# Patient Record
Sex: FEMALE | Race: WHITE | NOT HISPANIC OR LATINO | Employment: FULL TIME | URBAN - METROPOLITAN AREA
[De-identification: names, ages, dates, MRNs, and addresses within clinical notes are randomized per-mention and may not be internally consistent; named-entity substitution may affect disease eponyms.]

---

## 2022-06-27 ENCOUNTER — TRANSFERRED RECORDS (OUTPATIENT)
Dept: MULTI SPECIALTY CLINIC | Facility: CLINIC | Age: 32
End: 2022-06-27

## 2022-06-27 LAB
HPV ABSTRACT: NORMAL
PAP-ABSTRACT: NORMAL

## 2022-09-02 ENCOUNTER — MEDICAL CORRESPONDENCE (OUTPATIENT)
Dept: HEALTH INFORMATION MANAGEMENT | Facility: CLINIC | Age: 32
End: 2022-09-02

## 2022-09-28 ENCOUNTER — OFFICE VISIT (OUTPATIENT)
Dept: FAMILY MEDICINE | Facility: CLINIC | Age: 32
End: 2022-09-28
Payer: COMMERCIAL

## 2022-09-28 VITALS
OXYGEN SATURATION: 99 % | WEIGHT: 198 LBS | HEART RATE: 91 BPM | SYSTOLIC BLOOD PRESSURE: 118 MMHG | DIASTOLIC BLOOD PRESSURE: 80 MMHG

## 2022-09-28 DIAGNOSIS — D47.Z2 CASTLEMAN DISEASE (H): ICD-10-CM

## 2022-09-28 DIAGNOSIS — F33.42 RECURRENT MAJOR DEPRESSION IN COMPLETE REMISSION (H): ICD-10-CM

## 2022-09-28 DIAGNOSIS — G43.009 MIGRAINE WITHOUT AURA AND WITHOUT STATUS MIGRAINOSUS, NOT INTRACTABLE: ICD-10-CM

## 2022-09-28 DIAGNOSIS — F41.1 GENERALIZED ANXIETY DISORDER: ICD-10-CM

## 2022-09-28 DIAGNOSIS — Z13.220 SCREENING FOR LIPID DISORDERS: ICD-10-CM

## 2022-09-28 DIAGNOSIS — F17.200 NICOTINE DEPENDENCE DUE TO VAPING NON-TOBACCO PRODUCT: ICD-10-CM

## 2022-09-28 DIAGNOSIS — Z00.00 ROUTINE GENERAL MEDICAL EXAMINATION AT A HEALTH CARE FACILITY: Primary | ICD-10-CM

## 2022-09-28 PROBLEM — F32.A DEPRESSIVE DISORDER: Status: ACTIVE | Noted: 2018-11-09

## 2022-09-28 PROBLEM — M77.50 INTERMETATARSAL BURSITIS: Status: ACTIVE | Noted: 2020-02-11

## 2022-09-28 PROBLEM — R31.21 ASYMPTOMATIC MICROSCOPIC HEMATURIA: Status: ACTIVE | Noted: 2022-01-03

## 2022-09-28 LAB
ANION GAP SERPL CALCULATED.3IONS-SCNC: 13 MMOL/L (ref 7–15)
BUN SERPL-MCNC: 11.8 MG/DL (ref 6–20)
CALCIUM SERPL-MCNC: 9.6 MG/DL (ref 8.6–10)
CHLORIDE SERPL-SCNC: 106 MMOL/L (ref 98–107)
CHOLEST SERPL-MCNC: 217 MG/DL
CREAT SERPL-MCNC: 0.79 MG/DL (ref 0.51–0.95)
DEPRECATED HCO3 PLAS-SCNC: 23 MMOL/L (ref 22–29)
GFR SERPL CREATININE-BSD FRML MDRD: >90 ML/MIN/1.73M2
GLUCOSE SERPL-MCNC: 90 MG/DL (ref 70–99)
HDLC SERPL-MCNC: 51 MG/DL
LDLC SERPL CALC-MCNC: 145 MG/DL
NONHDLC SERPL-MCNC: 166 MG/DL
POTASSIUM SERPL-SCNC: 4.1 MMOL/L (ref 3.4–5.3)
SODIUM SERPL-SCNC: 142 MMOL/L (ref 136–145)
TRIGL SERPL-MCNC: 103 MG/DL

## 2022-09-28 PROCEDURE — 80048 BASIC METABOLIC PNL TOTAL CA: CPT | Performed by: FAMILY MEDICINE

## 2022-09-28 PROCEDURE — 99385 PREV VISIT NEW AGE 18-39: CPT | Mod: 25 | Performed by: FAMILY MEDICINE

## 2022-09-28 PROCEDURE — 80061 LIPID PANEL: CPT | Performed by: FAMILY MEDICINE

## 2022-09-28 PROCEDURE — 99214 OFFICE O/P EST MOD 30 MIN: CPT | Mod: 25 | Performed by: FAMILY MEDICINE

## 2022-09-28 PROCEDURE — 36415 COLL VENOUS BLD VENIPUNCTURE: CPT | Performed by: FAMILY MEDICINE

## 2022-09-28 PROCEDURE — 91312 COVID-19,PF,PFIZER BOOSTER BIVALENT: CPT | Performed by: FAMILY MEDICINE

## 2022-09-28 PROCEDURE — 0124A COVID-19,PF,PFIZER BOOSTER BIVALENT: CPT | Performed by: FAMILY MEDICINE

## 2022-09-28 RX ORDER — NIFEDIPINE 30 MG/1
30 TABLET, EXTENDED RELEASE ORAL
COMMUNITY
Start: 2022-06-27 | End: 2022-09-28

## 2022-09-28 RX ORDER — SERTRALINE HYDROCHLORIDE 100 MG/1
TABLET, FILM COATED ORAL
COMMUNITY
Start: 2021-10-27 | End: 2022-09-28

## 2022-09-28 RX ORDER — BUTALBITAL, ACETAMINOPHEN AND CAFFEINE 300; 40; 50 MG/1; MG/1; MG/1
1 CAPSULE ORAL
COMMUNITY
Start: 2022-06-27 | End: 2024-01-31

## 2022-09-28 RX ORDER — ACETAMINOPHEN AND CODEINE PHOSPHATE 120; 12 MG/5ML; MG/5ML
1 SOLUTION ORAL
COMMUNITY
Start: 2022-06-27 | End: 2024-01-31

## 2022-09-28 RX ORDER — ACETAMINOPHEN 500 MG
500 TABLET ORAL
COMMUNITY
End: 2023-09-05

## 2022-09-28 RX ORDER — NAPROXEN 500 MG/1
TABLET ORAL
COMMUNITY
Start: 2021-07-10

## 2022-09-28 RX ORDER — ACETAMINOPHEN AND CODEINE PHOSPHATE 300; 30 MG/1; MG/1
1 TABLET ORAL EVERY 6 HOURS PRN
COMMUNITY
Start: 2021-10-19 | End: 2023-08-02

## 2022-09-28 NOTE — PROGRESS NOTES
"   SUBJECTIVE:   CC: Deanna is an 32 year old who presents for preventive health visit.     History of Present Illness       Reason for visit:  Wellness visit    She eats 2-3 servings of fruits and vegetables daily.She consumes 3 sweetened beverage(s) daily.She exercises with enough effort to increase her heart rate 9 or less minutes per day.  She exercises with enough effort to increase her heart rate 3 or less days per week. She is missing 7 dose(s) of medications per week.      PROBLEMS TO ADD ON...  1. Has migraine headaches and periodically has to use Tylenol #3 for more severe migraines.  Typically will start with regular Tylenol, then after a few hours will take Butalbital-APAP-Caffeine, then if not better, will take Tylenol #3.  Thinks she has an abortive therapy at home, can't remember the name.  Imitrex and Maxalt seemed to make the migraines worse.  No refills needed right now, unsure what she wants to do moving forward.  2. Has \"always\" had some anxiety and depression.  Focuses mainly on her health in terms of anxiety.  Feels that she's \"not good about taking sertraline lately\".  Takes this medication approximately twice weekly.  3. Hasn't started Micronor yet.  4. Nifedipine ER was for gestational hypertension and some blood pressure issues after birth.  Not taking now for about 1 month.      Today's PHQ-2 Score:   PHQ-2 ( 1999 [employer]) 9/28/2022   Q1: Little interest or pleasure in doing things 0   Q2: Feeling down, depressed or hopeless 0   PHQ-2 Score 0   Q1: Little interest or pleasure in doing things Not at all   Q2: Feeling down, depressed or hopeless Not at all   PHQ-2 Score 0       Abuse: Current or Past (Physical, Sexual or Emotional) - No  Do you feel safe in your environment? YES    Have you ever done Advance Care Planning? (For example, a Health Directive, POLST, or a discussion with a medical provider or your loved ones about your wishes): No, advance care planning information given to " patient to review.  Patient declined advance care planning discussion at this time.    Social History     Tobacco Use     Smoking status: Former Smoker     Years: 2.00     Start date:      Quit date:      Years since quittin.     Smokeless tobacco: Never Used   Substance Use Topics     Alcohol use: Not Currently         No flowsheet data found.    Reviewed orders with patient.  Reviewed health maintenance and updated orders accordingly - Yes    BP Readings from Last 3 Encounters:   22 118/80    Wt Readings from Last 3 Encounters:   22 89.8 kg (198 lb)                  Patient Active Problem List   Diagnosis     Asymptomatic microscopic hematuria     Castleman disease (H)     Depressive disorder     Generalized anxiety disorder     Intermetatarsal bursitis     Migraine without aura and without status migrainosus, not intractable     Normal spontaneous vaginal delivery     PCOS (polycystic ovarian syndrome)     Past Surgical History:   Procedure Laterality Date     BIOPSY/REMOVAL, LYMPH NODE(S)      left neck     CERVICAL FUSION       CHOLECYSTECTOMY         Social History     Tobacco Use     Smoking status: Former Smoker     Years: 2.00     Start date:      Quit date:      Years since quittin.7     Smokeless tobacco: Never Used   Substance Use Topics     Alcohol use: Not Currently     Family History   Problem Relation Age of Onset     Cancer Maternal Grandfather         lung     Breast Cancer No family hx of      Colorectal Cancer No family hx of      Coronary Artery Disease No family hx of      Coronary Artery Disease Early Onset No family hx of      Diabetes No family hx of          Current Outpatient Medications   Medication Sig Dispense Refill     acetaminophen-codeine (TYLENOL W/CODEINE #3) 300-30 MG per tablet Take 1 tablet by mouth every 6 hours as needed       Butalbital-APAP-Caffeine -40 MG CAPS Take 1 capsule by mouth       naproxen (NAPROSYN) 500 MG tablet Take 1  tab at onset of headache. Can repeat 1 more tab later in day if needed. Limit to 2 pills per day and no more than 3 days per week.       norethindrone (MICRONOR) 0.35 MG tablet Take 1 tablet by mouth       acetaminophen (TYLENOL) 500 MG tablet Take 500 mg by mouth       Allergies   Allergen Reactions     Bee Anaphylaxis and Swelling     Swelling of arm as a child         Breast Cancer Screening:    FHS-7: No flowsheet data found.      History of abnormal Pap smear: NO - age 30-65 PAP every 5 years with negative HPV co-testing recommended     Reviewed and updated as needed this visit by clinical staff   Tobacco  Allergies  Meds   Med Hx  Surg Hx  Fam Hx  Soc Hx          Reviewed and updated as needed this visit by Provider   Tobacco  Allergies  Meds  Problems  Med Hx  Surg Hx  Fam Hx             Review of Systems  CONSTITUTIONAL: NEGATIVE for fever, chills, change in weight  INTEGUMENTARU/SKIN: NEGATIVE for worrisome rashes, moles or lesions  EYES: NEGATIVE for vision changes or irritation  ENT: NEGATIVE for ear, mouth and throat problems  RESP: NEGATIVE for significant cough or SOB  BREAST: NEGATIVE for masses, tenderness or discharge  CV: NEGATIVE for chest pain, palpitations or peripheral edema  GI: NEGATIVE for nausea, abdominal pain, heartburn, or change in bowel habits  : NEGATIVE for unusual urinary or vaginal symptoms. Periods are regular.  MUSCULOSKELETAL: NEGATIVE for significant arthralgias or myalgia  NEURO: NEGATIVE for weakness, dizziness or paresthesias  PSYCHIATRIC: NEGATIVE for changes in mood or affect     OBJECTIVE:   /80   Pulse 91   Wt 89.8 kg (198 lb)   SpO2 99%   Breastfeeding Yes   Physical Exam  GENERAL: healthy, alert and no distress  EYES: Eyes grossly normal to inspection, PERRL and conjunctivae and sclerae normal  HENT: ear canals and TM's normal, nose and mouth without ulcers or lesions  NECK: no adenopathy, no asymmetry, masses, or scars and thyroid normal to  palpation  RESP: lungs clear to auscultation - no rales, rhonchi or wheezes  BREAST: normal without masses, tenderness or nipple discharge and no palpable axillary masses or adenopathy  CV: regular rate and rhythm, normal S1 S2, no S3 or S4, no murmur, click or rub, no peripheral edema and peripheral pulses strong  ABDOMEN: soft, nontender, no hepatosplenomegaly, no masses and bowel sounds normal  MS: no gross musculoskeletal defects noted, no edema  SKIN: no suspicious lesions or rashes  NEURO: Normal strength and tone, mentation intact and speech normal  PSYCH: mentation appears normal, affect normal/bright    Diagnostic Test Results:  Labs reviewed in Epic  Pending at time of note    ASSESSMENT/PLAN:   1. Routine general medical examination at a health care facility  Routine history and physical, updated in EMR.  Fasting labs updated as below.  Immunizations updated today.  Pap smear is up-to-date, will be due in 2027.  Plan repeat physical in 1 year.  - Lipid panel reflex to direct LDL Fasting; Future  - Basic metabolic panel  (Ca, Cl, CO2, Creat, Gluc, K, Na, BUN); Future    2. Recurrent major depression in complete remission (H)  3. Generalized anxiety disorder  Patient has not been taking her sertraline reliably.  We discussed that for maximal effectiveness and to minimize side effects, she really needs to take this daily, likely starting at a lower dose.  She wishes to hold off for now and reassess as needed.  She will stay off SSRI medication for the time being.  - Basic metabolic panel  (Ca, Cl, CO2, Creat, Gluc, K, Na, BUN); Future    4. Migraine without aura and without status migrainosus, not intractable  Patient has a stepwise approach to treatment using Tylenol, butalbital-acetaminophen-caffeine, and Tylenol 3 sparingly.  Unclear what abortive treatment she is currently using.  We can reassess this as needed as well.  - Basic metabolic panel  (Ca, Cl, CO2, Creat, Gluc, K, Na, BUN); Future    5.  Castleman disease (H)  No recent issues in this regard.  Has only had 1 lymph node removed in the past it sounds like.    6. Nicotine dependence due to vaping non-tobacco product  Discussed trying to taper by 1 vaping session per week.  Patient is contemplative about this.    7. Screening for lipid disorders  Fasting lipid profile updated today.  - Lipid panel reflex to direct LDL Fasting; Future      Patient has been advised of split billing requirements and indicates understanding: Yes    COUNSELING:  Reviewed preventive health counseling, as reflected in patient instructions  Special attention given to:        Regular exercise       Healthy diet/nutrition       Contraception       Family planning    There is no height or weight on file to calculate BMI.        She reports that she quit smoking about 9 years ago. She started smoking about 11 years ago. She quit after 2.00 years of use. She has never used smokeless tobacco.      Counseling Resources:  ATP IV Guidelines  Pooled Cohorts Equation Calculator  Breast Cancer Risk Calculator  BRCA-Related Cancer Risk Assessment: FHS-7 Tool  FRAX Risk Assessment  ICSI Preventive Guidelines  Dietary Guidelines for Americans, 2010  USDA's MyPlate  ASA Prophylaxis  Lung CA Screening    Nancy Jones MD  [facility]

## 2022-09-28 NOTE — PROGRESS NOTES
{PROVIDER CHARTING PREFERENCE:343156}    Sujatha Huerta is a 32 year old{ACCOMPANIED BY STATEMENT (Optional):589418}, presenting for the following health issues:  Establish Care      History of Present Illness       Reason for visit:  Wellness visit    She eats 2-3 servings of fruits and vegetables daily.She consumes 3 sweetened beverage(s) daily.She exercises with enough effort to increase her heart rate 9 or less minutes per day.  She exercises with enough effort to increase her heart rate 3 or less days per week. She is missing 7 dose(s) of medications per week.       {SUPERLIST (Optional):118504}  {additonal problems for provider to add (Optional):134626}    Review of Systems   {ROS COMP (Optional):649123}      Objective    There were no vitals taken for this visit.  There is no height or weight on file to calculate BMI.  Physical Exam   {Exam List (Optional):270881}    {Diagnostic Test Results (Optional):311471}    {AMBULATORY ATTESTATION (Optional):065645}

## 2022-09-28 NOTE — PATIENT INSTRUCTIONS
Feel free to follow-up sooner than your next physical if needed for depression or anxiety.      Preventive Health Recommendations  Female Ages 26 - 39  Yearly exam:   See your health care provider every year in order to  Review health changes.   Discuss preventive care.    Review your medicines if you your doctor has prescribed any.    Until age 30: Get a Pap test every three years (more often if you have had an abnormal result).    After age 30: Talk to your doctor about whether you should have a Pap test every 3 years or have a Pap test with HPV screening every 5 years.   You do not need a Pap test if your uterus was removed (hysterectomy) and you have not had cancer.  You should be tested each year for STDs (sexually transmitted diseases), if you're at risk.   Talk to your provider about how often to have your cholesterol checked.  If you are at risk for diabetes, you should have a diabetes test (fasting glucose).  Shots: Get a flu shot each year. Get a tetanus shot every 10 years.   Nutrition:   Eat at least 5 servings of fruits and vegetables each day.  Eat whole-grain bread, whole-wheat pasta and brown rice instead of white grains and rice.  Get adequate Calcium and Vitamin D.     Lifestyle  Exercise at least 150 minutes a week (30 minutes a day, 5 days of the week). This will help you control your weight and prevent disease.  Limit alcohol to one drink per day.  No smoking.   Wear sunscreen to prevent skin cancer.  See your dentist every six months for an exam and cleaning.

## 2022-10-25 ENCOUNTER — PREP FOR PROCEDURE (OUTPATIENT)
Dept: VASCULAR SURGERY | Facility: CLINIC | Age: 32
End: 2022-10-25

## 2022-10-25 ENCOUNTER — OFFICE VISIT (OUTPATIENT)
Dept: PODIATRY | Facility: CLINIC | Age: 32
End: 2022-10-25
Payer: COMMERCIAL

## 2022-10-25 VITALS — OXYGEN SATURATION: 90 % | HEART RATE: 97 BPM | HEIGHT: 64 IN | WEIGHT: 196 LBS | BODY MASS INDEX: 33.46 KG/M2

## 2022-10-25 DIAGNOSIS — L98.9 SKIN LESION OF FOOT: ICD-10-CM

## 2022-10-25 DIAGNOSIS — M76.70 PERONEAL TENDINITIS, UNSPECIFIED LATERALITY: Primary | ICD-10-CM

## 2022-10-25 DIAGNOSIS — L57.0 KERATOMA: ICD-10-CM

## 2022-10-25 PROCEDURE — 99203 OFFICE O/P NEW LOW 30 MIN: CPT | Mod: 25 | Performed by: PODIATRIST

## 2022-10-25 PROCEDURE — 11056 PARNG/CUTG B9 HYPRKR LES 2-4: CPT | Performed by: PODIATRIST

## 2022-10-25 RX ORDER — NAPROXEN 500 MG/1
500 TABLET ORAL 2 TIMES DAILY WITH MEALS
Qty: 28 TABLET | Refills: 0 | Status: SHIPPED | OUTPATIENT
Start: 2022-10-25 | End: 2023-08-02

## 2022-10-25 ASSESSMENT — PAIN SCALES - GENERAL: PAINLEVEL: MILD PAIN (2)

## 2022-10-25 NOTE — PROGRESS NOTES
Patient's OB/GYN has okay'd starting naproxen. I sent a prescription for naproxen today and notified the patient.

## 2022-10-25 NOTE — PROGRESS NOTES
FOOT AND ANKLE SURGERY/PODIATRY CONSULT NOTE         ASSESSMENT:   Peroneal Tendinitis bilateral  Skin lesion left foot      TREATMENT:  Peroneal Tendinitis: I reviewed the etiology of peroneal tendinitis and recommend more supportive shoes.     -The patient is breast feeding and I have asked she confirm with her OB/GYN that naproxen is okay to begin at this time.     -Will also consider physical therapy, CAM boot, foot x-rays if symptoms persist.     Skin lesion left heel: I trimmed callus tissue x2 with a #15 blade plantar medial left heel.    -I discussed with the patient that the skin lesion does not appear consistent with a wart. I recommend a punch biopsy for further evaluation. Reviewed the procedure and post-op course.     -Patient's questions invited and answered. She was encouraged to call my office with any further questions or concerns.     Srinivasa Del Rio DPM  [facility] Podiat[facility]/Foot & Ankle Surgery      HPI: I was asked to see Deanna Hernandez today for painful feet and a skin lesion on her left heel. The patient states she has had pain in both feet for several months, denies trauma or previous treatment. She is currently breastfeeding. She also would like to discuss a skin lesion on the left heel which she has had for the past 15 years. Admits to having the skin frozen in the past with no improvement.       Past Medical History:   Diagnosis Date     Normal spontaneous vaginal delivery 2019         Social History     Socioeconomic History     Marital status:      Spouse name: Not on file     Number of children: Not on file     Years of education: Not on file     Highest education level: Not on file   Occupational History     Not on file   Tobacco Use     Smoking status: Every Day     Years: 2.00     Types: Cigarettes, Vaping Device     Start date:      Last attempt to quit: 2013     Years since quittin.8     Smokeless tobacco: Never   Vaping Use     Vaping Use: Every  "day     Substances: Nicotine   Substance and Sexual Activity     Alcohol use: Not Currently     Drug use: Not Currently     Sexual activity: Yes     Partners: Male     Birth control/protection: None   Other Topics Concern     Not on file   Social History Narrative     Not on file     Social Determinants of Health     Financial Resource Strain: Not on file   Food Insecurity: Not on file   Transportation Needs: Not on file   Physical Activity: Not on file   Stress: Not on file   Social Connections: Not on file   Intimate Partner Violence: Not on file   Housing Stability: Not on file            Allergies   Allergen Reactions     Bee Anaphylaxis and Swelling     Swelling of arm as a child           MEDICATIONS:   Current Outpatient Medications   Medication     acetaminophen (TYLENOL) 500 MG tablet     acetaminophen-codeine (TYLENOL W/CODEINE #3) 300-30 MG per tablet     Butalbital-APAP-Caffeine -40 MG CAPS     naproxen (NAPROSYN) 500 MG tablet     norethindrone (MICRONOR) 0.35 MG tablet     No current facility-administered medications for this visit.        Family History   Problem Relation Age of Onset     Cancer Maternal Grandfather         lung     Breast Cancer No family hx of      Colorectal Cancer No family hx of      Coronary Artery Disease No family hx of      Coronary Artery Disease Early Onset No family hx of      Diabetes No family hx of           Review of Systems - 10 point Review of Systems is negative except for foot pain which is noted in HPI.    OBJECTIVE:  Appearance: alert, well appearing, and in no distress.    VITAL SIGNS: Pulse 97   Ht 1.626 m (5' 4\")   Wt 88.9 kg (196 lb)   SpO2 90%   BMI 33.64 kg/m        General appearance: Patient is alert and fully cooperative with history & exam.  No sign of distress is noted during the visit.     Psychiatric: Affect is pleasant & appropriate.  Patient appears motivated to improve health.     Respiratory: Breathing is regular & unlabored while " sitting.     HEENT: Hearing is intact to spoken word.  Speech is clear.  No gross evidence of visual impairment that would impact ambulation.      Vascular: Dorsalis pedis and posterior tibial pulses are palpable. There is pedal hair growth bilateral.  CFT < 3 sec from anterior tibial surface to distal digits bilateral. There is no appreciable edema noted.  Dermatologic: Hyperkeratotic tissue plantar medial left heel x2, after debridement there is disruption of the normal skin lines without pinpoint bleeding.  Neurologic: All epicritic and proprioceptive sensations are grossly intact bilateral.  Musculoskeletal: Mild pain along the course of the peroneal tendons bilateral and at insertion to the 5th metatarsal base.

## 2022-10-25 NOTE — PATIENT INSTRUCTIONS
Naproxen and naproxen sodium oral immediate-release tablets    Brand Names: Aflaxen, Aleve, Aleve Arthritis, All Day Relief, Anaprox, Anaprox DS, Naprosyn   What is this medicine?  NAPROXEN (na PROX en) is a non-steroidal anti-inflammatory drug (NSAID). It is used to reduce swelling and to treat pain. This medicine may be used for dental pain, headache, or painful monthly periods. It is also used for painful joint and muscular problems such as arthritis, tendinitis, bursitis, and gout.  How should I use this medicine?  Take this medicine by mouth with a glass of water. Follow the directions on the prescription label. Take it with food if your stomach gets upset. Try to not lie down for at least 10 minutes after you take it. Take your medicine at regular intervals. Do not take your medicine more often than directed. Long-term, continuous use may increase the risk of heart attack or stroke.  A special MedGuide will be given to you by the pharmacist with each prescription and refill. Be sure to read this information carefully each time.  Talk to your pediatrician regarding the use of this medicine in children. Special care may be needed.  What side effects may I notice from receiving this medicine?  Side effects that you should report to your doctor or health care professional as soon as possible:  black or bloody stools, blood in the urine or vomit  blurred vision  chest pain  difficulty breathing or wheezing  nausea or vomiting  severe stomach pain  skin rash, skin redness, blistering or peeling skin, hives, or itching  slurred speech or weakness on one side of the body  swelling of eyelids, throat, lips  unexplained weight gain or swelling  unusually weak or tired  yellowing of eyes or skin  Side effects that usually do not require medical attention (report to your doctor or health care professional if they continue or are bothersome):  constipation  headache  heartburn  What may interact with this  medicine?  alcohol  aspirin  cidofovir  diuretics  lithium  methotrexate  other drugs for inflammation like ketorolac or prednisone  pemetrexed  probenecid  warfarin  What if I miss a dose?  If you miss a dose, take it as soon as you can. If it is almost time for your next dose, take only that dose. Do not take double or extra doses.  Where should I keep my medicine?  Keep out of the reach of children.  Store at room temperature between 15 and 30 degrees C (59 and 86 degrees F). Keep container tightly closed. Throw away any unused medicine after the expiration date.  What should I tell my health care provider before I take this medicine?  They need to know if you have any of these conditions:  cigarette smoker  coronary artery bypass graft (CABG) surgery within the past 2 weeks  drink more than 3 alcohol-containing drinks a day  heart disease  high blood pressure  history of stomach bleeding  kidney disease  liver disease  lung or breathing disease, like asthma  an unusual or allergic reaction to naproxen, aspirin, other NSAIDs, other medicines, foods, dyes, or preservatives  pregnant or trying to get pregnant  breast-feeding  What should I watch for while using this medicine?  Tell your doctor or health care professional if your pain does not get better. Talk to your doctor before taking another medicine for pain. Do not treat yourself.  This medicine does not prevent heart attack or stroke. In fact, this medicine may increase the chance of a heart attack or stroke. The chance may increase with longer use of this medicine and in people who have heart disease. If you take aspirin to prevent heart attack or stroke, talk with your doctor or health care professional.  Do not take other medicines that contain aspirin, ibuprofen, or naproxen with this medicine. Side effects such as stomach upset, nausea, or ulcers may be more likely to occur. Many medicines available without a prescription should not be taken with this  medicine.  This medicine can cause ulcers and bleeding in the stomach and intestines at any time during treatment. Do not smoke cigarettes or drink alcohol. These increase irritation to your stomach and can make it more susceptible to damage from this medicine. Ulcers and bleeding can happen without warning symptoms and can cause death.  You may get drowsy or dizzy. Do not drive, use machinery, or do anything that needs mental alertness until you know how this medicine affects you. Do not stand or sit up quickly, especially if you are an older patient. This reduces the risk of dizzy or fainting spells.  This medicine can cause you to bleed more easily. Try to avoid damage to your teeth and gums when you brush or floss your teeth.    Medicine is given to help treat or prevent illness. But if you don t take it correctly, it might not help. It might even harm you. Your healthcare provider or pharmacist can help you learn the right way to take your medicine. Listed below are some tips to help you take medicine safely.  Safety tips  Have a routine for taking each medicine. Make it part of something you do each day, such as brushing your teeth or eating a meal.  When you go to the hospital or your healthcare provider s office, bring all your current medicines in their original boxes or bottles. If you can t do that, bring an up-to-date list of your medicines.  Don't stop taking a prescription medicine unless your healthcare provider tells you to. Doing so could make your condition worse.  Don't share medicines.  Let your healthcare provider and pharmacist know of any allergies you have.  Taking prescription medicines with alcohol, street drugs, herbs, supplements, or even some over-the-counter medicines can be harmful. Talk to your healthcare provider or pharmacist before using any of these things while taking a prescription medicine.  When filling your prescriptions, try using the same pharmacy for all your medicines. If  that isn't possible, let each pharmacist know what medicines you are already taking.  Keep medicines out of the reach of children and pets. Store medicines in a cool, dry, dark place -- not in the bathroom or in the kitchen near moisture or heat.  Don't use medicine that has  or that doesn t look or smell right. Call your pharmacist for instructions on how to dispose of your medicines or where you can take them for safe disposal.  Medicine that comes in a container for a single dose should be used only 1 time. If you use the container a second time, it may have germs in it that can cause illness. These illnesses include hepatitis B and C. They also include infections of the brain or spinal cord (meningitis and epidural abscess).

## 2022-10-25 NOTE — LETTER
10/25/2022         RE: Deanna Hernandez  [address]  [place] [address]        Dear Colleague,    Thank you for referring your patient, Deanna Hernandez, to the [facility]. Please see a copy of my visit note below.          FOOT AND ANKLE SURGERY/PODIATRY CONSULT NOTE         ASSESSMENT:   Peroneal Tendinitis bilateral  Skin lesion left foot      TREATMENT:  Peroneal Tendinitis: I reviewed the etiology of peroneal tendinitis and recommend more supportive shoes.     -The patient is breast feeding and I have asked she confirm with her OB/GYN that naproxen is okay to begin at this time.     -Will also consider physical therapy, CAM boot, foot x-rays if symptoms persist.     Skin lesion left heel: I trimmed callus tissue x2 with a #15 blade plantar medial left heel.    -I discussed with the patient that the skin lesion does not appear consistent with a wart. I recommend a punch biopsy for further evaluation. Reviewed the procedure and post-op course.     -Patient's questions invited and answered. She was encouraged to call my office with any further questions or concerns.     Srinivasa Del Rio DPM  [facility] Podiatry/Foot & Ankle Surgery      HPI: I was asked to see Deanna Hernandez today for painful feet and a skin lesion on her left heel. The patient states she has had pain in both feet for several months, denies trauma or previous treatment. She is currently breastfeeding. She also would like to discuss a skin lesion on the left heel which she has had for the past 15 years. Admits to having the skin frozen in the past with no improvement.       Past Medical History:   Diagnosis Date     Normal spontaneous vaginal delivery 11/4/2019         Social History     Socioeconomic History     Marital status:      Spouse name: Not on file     Number of children: Not on file     Years of education: Not on file     Highest education level: Not on file   Occupational  "History     Not on file   Tobacco Use     Smoking status: Every Day     Years: 2.00     Types: Cigarettes, Vaping Device     Start date:      Last attempt to quit: 2013     Years since quittin.8     Smokeless tobacco: Never   Vaping Use     Vaping Use: Every day     Substances: Nicotine   Substance and Sexual Activity     Alcohol use: Not Currently     Drug use: Not Currently     Sexual activity: Yes     Partners: Male     Birth control/protection: None   Other Topics Concern     Not on file   Social History Narrative     Not on file     Social Determinants of Health     Financial Resource Strain: Not on file   Food Insecurity: Not on file   Transportation Needs: Not on file   Physical Activity: Not on file   Stress: Not on file   Social Connections: Not on file   Intimate Partner Violence: Not on file   Housing Stability: Not on file            Allergies   Allergen Reactions     Bee Anaphylaxis and Swelling     Swelling of arm as a child           MEDICATIONS:   Current Outpatient Medications   Medication     acetaminophen (TYLENOL) 500 MG tablet     acetaminophen-codeine (TYLENOL W/CODEINE #3) 300-30 MG per tablet     Butalbital-APAP-Caffeine -40 MG CAPS     naproxen (NAPROSYN) 500 MG tablet     norethindrone (MICRONOR) 0.35 MG tablet     No current facility-administered medications for this visit.        Family History   Problem Relation Age of Onset     Cancer Maternal Grandfather         lung     Breast Cancer No family hx of      Colorectal Cancer No family hx of      Coronary Artery Disease No family hx of      Coronary Artery Disease Early Onset No family hx of      Diabetes No family hx of           Review of Systems - 10 point Review of Systems is negative except for foot pain which is noted in HPI.    OBJECTIVE:  Appearance: alert, well appearing, and in no distress.    VITAL SIGNS: Pulse 97   Ht 1.626 m (5' 4\")   Wt 88.9 kg (196 lb)   SpO2 90%   BMI 33.64 kg/m        General appearance: " Patient is alert and fully cooperative with history & exam.  No sign of distress is noted during the visit.     Psychiatric: Affect is pleasant & appropriate.  Patient appears motivated to improve health.     Respiratory: Breathing is regular & unlabored while sitting.     HEENT: Hearing is intact to spoken word.  Speech is clear.  No gross evidence of visual impairment that would impact ambulation.      Vascular: Dorsalis pedis and posterior tibial pulses are palpable. There is pedal hair growth bilateral.  CFT < 3 sec from anterior tibial surface to distal digits bilateral. There is no appreciable edema noted.  Dermatologic: Hyperkeratotic tissue plantar medial left heel x2, after debridement there is disruption of the normal skin lines without pinpoint bleeding.  Neurologic: All epicritic and proprioceptive sensations are grossly intact bilateral.  Musculoskeletal: Mild pain along the course of the peroneal tendons bilateral and at insertion to the 5th metatarsal base.             Again, thank you for allowing me to participate in the care of your patient.        Sincerely,        Srinivasa Del Rio DPM

## 2022-11-16 ENCOUNTER — MEDICAL CORRESPONDENCE (OUTPATIENT)
Dept: HEALTH INFORMATION MANAGEMENT | Facility: CLINIC | Age: 32
End: 2022-11-16

## 2023-03-29 ENCOUNTER — MEDICAL CORRESPONDENCE (OUTPATIENT)
Dept: HEALTH INFORMATION MANAGEMENT | Facility: CLINIC | Age: 33
End: 2023-03-29

## 2023-05-26 ENCOUNTER — OFFICE VISIT (OUTPATIENT)
Dept: FAMILY MEDICINE | Facility: CLINIC | Age: 33
End: 2023-05-26
Payer: COMMERCIAL

## 2023-05-26 VITALS
DIASTOLIC BLOOD PRESSURE: 89 MMHG | HEART RATE: 86 BPM | TEMPERATURE: 98.8 F | RESPIRATION RATE: 14 BRPM | OXYGEN SATURATION: 100 % | SYSTOLIC BLOOD PRESSURE: 131 MMHG

## 2023-05-26 DIAGNOSIS — H60.11 CELLULITIS OF RIGHT PINNA: Primary | ICD-10-CM

## 2023-05-26 PROCEDURE — 99213 OFFICE O/P EST LOW 20 MIN: CPT | Performed by: PHYSICIAN ASSISTANT

## 2023-05-26 RX ORDER — CEFDINIR 300 MG/1
300 CAPSULE ORAL 2 TIMES DAILY
Qty: 20 CAPSULE | Refills: 0 | Status: SHIPPED | OUTPATIENT
Start: 2023-05-26 | End: 2023-06-05

## 2023-05-26 NOTE — PROGRESS NOTES
Assessment & Plan:      Problem List Items Addressed This Visit    None  Visit Diagnoses     Cellulitis of right pinna    -  Primary    Relevant Medications    cefdinir (OMNICEF) 300 MG capsule        Medical Decision Making  Patient presents with acute onset swelling, erythema, and pain of the outer right ear.  Physical exam appears consistent with ear cellulitis likely secondary to previous ear piercing.  Recommend oral antibiotics.  May continue with cold compresses and ibuprofen as needed for pain and swelling.  Discussed treatment and symptomatic care.  Allergies and medication interactions reviewed.  Discussed signs of worsening symptoms and when to follow-up with PCP if no symptom improvement.     Subjective:      Deanna Hernandez is a 33 year old female here for evaluation of swelling, redness, and pain of the right outer ear.  Patient had a piercing of the auricle of the ear about 12 years ago and has had recurring ear infections ever since.  No other fevers, cough, rhinorrhea.  No recent swimming exposure.     The following portions of the patient's history were reviewed and updated as appropriate: allergies, current medications, and problem list.     Review of Systems  Pertinent items are noted in HPI.    Allergies  Allergies   Allergen Reactions     Bee Anaphylaxis and Swelling     Swelling of arm as a child         Family History   Problem Relation Age of Onset     Cancer Maternal Grandfather         lung     Breast Cancer No family hx of      Colorectal Cancer No family hx of      Coronary Artery Disease No family hx of      Coronary Artery Disease Early Onset No family hx of      Diabetes No family hx of        Social History     Tobacco Use     Smoking status: Every Day     Types: Vaping Device     Smokeless tobacco: Never   Vaping Use     Vaping status: Every Day     Substances: Nicotine   Substance Use Topics     Alcohol use: Not Currently        Objective:      /89   Pulse 86   Temp 98.8   F (37.1  C)   Resp 14   SpO2 100%   General appearance - alert, well appearing, and in no distress and non-toxic  Ears - Right: Outer auricle is erythematous, swollen, and covered in slight honey colored crusting; erythema extends into the external ear canal; TM otherwise intact with no fluid, bulging, erythema; no extension of the erythema over the mastoid region    The use of Dragon/As It Is dictation services was used to construct the content of this note; any grammatical errors are non-intentional. Please contact the author directly if you are in need of any clarification.

## 2023-07-22 ENCOUNTER — HOSPITAL ENCOUNTER (EMERGENCY)
Facility: CLINIC | Age: 33
Discharge: HOME | End: 2023-07-22
Attending: EMERGENCY MEDICINE | Admitting: EMERGENCY MEDICINE
Payer: COMMERCIAL

## 2023-07-22 VITALS
HEART RATE: 92 BPM | SYSTOLIC BLOOD PRESSURE: 136 MMHG | DIASTOLIC BLOOD PRESSURE: 88 MMHG | HEIGHT: 64 IN | RESPIRATION RATE: 16 BRPM | TEMPERATURE: 97.9 F | BODY MASS INDEX: 32.1 KG/M2 | OXYGEN SATURATION: 99 % | WEIGHT: 188 LBS

## 2023-07-22 DIAGNOSIS — S61.210A LACERATION OF RIGHT INDEX FINGER WITHOUT FOREIGN BODY WITHOUT DAMAGE TO NAIL, INITIAL ENCOUNTER: ICD-10-CM

## 2023-07-22 PROCEDURE — 12001 RPR S/N/AX/GEN/TRNK 2.5CM/<: CPT

## 2023-07-22 PROCEDURE — 99283 EMERGENCY DEPT VISIT LOW MDM: CPT | Mod: 25

## 2023-07-22 ASSESSMENT — ENCOUNTER SYMPTOMS: WOUND: 1

## 2023-07-22 ASSESSMENT — ACTIVITIES OF DAILY LIVING (ADL): ADLS_ACUITY_SCORE: 35

## 2023-07-22 NOTE — ED PROVIDER NOTES
Emergency Department Encounter      NAME: Deanna Hernandez  AGE: 33 year old female  YOB: 1990  MRN: [id number]  EVALUATION DATE & TIME: 2023 12:34 AM    PCP: Nancy Jones    ED PROVIDER: James Campbell M.D.      Chief Complaint   Patient presents with     Fall     Hand Pain         FINAL IMPRESSION:  1. Laceration of right index finger without foreign body without damage to nail, initial encounter        MEDICAL DECISION MAKIN:00 AM I met with the patient, obtained history, performed an initial exam, and discussed options and plan for diagnostics and treatment here in the ED.     This patient is a 33-year-old female with a history of polycystic ovarian disease who presents with finger laceration.  Patient states that she was drinking alcohol tonight and that she excellently tripped and fell on gravel.  After the fall she noticed that she had a laceration of the right index finger pad.  She did not have any other injuries from the fall.  She does not think there is any foreign body within the wound.  On my exam she had a small fairly superficial laceration at the right index fingertip.  There was no active bleeding, signs of infection or foreign body.  There was no bony tenderness on the digits.  I discussed with her different ways of repairing the laceration.  She did not feel she be able to care for it properly if I glued it with Dermabond so we proceeded to repair with sutures.  I did a digital block on the patient which was incomplete and they needed to inject additional lidocaine within the edges of the wound.  A tourniquet was placed and removed after the procedure.  There was no sign of foreign body seen.  no bone, tendon or joint involvement.  The wound edges reapproximated using 5-0 Ethilon suture.  We discussed signs and symptoms of wound infection and she was discharged home to have the sutures removed by her doctor's office in 7 to 10 days.    Pertinent Labs & Imaging  studies reviewed. (See chart for details)    The importance of close follow up was discussed. We reviewed warning signs and symptoms, and I instructed Ms. Hernandez to return to the emergency department immediately if she develops any new or worsening symptoms. I provided additional verbal discharge instructions. Ms. Hernandez expressed understanding and agreement with this plan of care, her questions were answered, and she was discharged in stable condition.     Medical Decision Making     History:    Supplemental history from: Documented in chart, if applicable    External Record(s) reviewed: Documented in chart, if applicable.     Work Up:    Chart documentation includes differential considered and any EKGs or imaging independently interpreted by provider, where specified.    In additional to work up documented, I considered the following work up: Documented in chart, if applicable.     External consultation:    Discussion of management with another provider: Documented in chart, if applicable     Complicating factors:    Care impacted by chronic illness: NA    Care affected by social determinants of health: Access to Medical Care     Disposition considerations: Discharge      MEDICATIONS GIVEN IN THE EMERGENCY:  Medications - No data to display    NEW PRESCRIPTIONS STARTED AT TODAY'S ER VISIT:  Discharge Medication List as of 7/22/2023  2:26 AM             =================================================================    HPI    Patient information was obtained from: patient and friend    Use of : N/A       Deanna Hernandez is a 33 year old female with a past medical history of polycystic ovary disease who presents with finger laceration.    The patient presents with a right index finger laceration that happened when she tripped and fell on gravel. The laceration is on the pad of her finger. She has been drinking alcohol.  The patient has 4 kids and worried that Steri-strips would be at risk of coming  off.     She denies any other complaints at this time.       REVIEW OF SYSTEMS   Review of Systems   Skin: Positive for wound (right index finger).   All other systems reviewed and are negative.       PAST MEDICAL HISTORY:  Past Medical History:   Diagnosis Date     Normal spontaneous vaginal delivery 11/4/2019       PAST SURGICAL HISTORY:  Past Surgical History:   Procedure Laterality Date     BIOPSY/REMOVAL, LYMPH NODE(S)      left neck     CERVICAL FUSION       CHOLECYSTECTOMY         CURRENT MEDICATIONS:    No current facility-administered medications for this encounter.    Current Outpatient Medications:      acetaminophen (TYLENOL) 500 MG tablet, Take 500 mg by mouth (Patient not taking: Reported on 5/26/2023), Disp: , Rfl:      acetaminophen-codeine (TYLENOL W/CODEINE #3) 300-30 MG per tablet, Take 1 tablet by mouth every 6 hours as needed, Disp: , Rfl:      Butalbital-APAP-Caffeine -40 MG CAPS, Take 1 capsule by mouth (Patient not taking: Reported on 5/26/2023), Disp: , Rfl:      EPINEPHrine (ANY BX GENERIC EQUIV) 0.3 MG/0.3ML injection 2-pack, INJECT 1 SYRINGE INTRAMUSCULARLY AS NEEDED FOR BEE STING (Patient not taking: Reported on 5/26/2023), Disp: , Rfl:      ibuprofen (ADVIL/MOTRIN) 600 MG tablet, Take 600 mg by mouth every 6 hours as needed, Disp: , Rfl:      naproxen (NAPROSYN) 500 MG tablet, Take 1 tablet (500 mg) by mouth 2 times daily (with meals) (Patient not taking: Reported on 5/26/2023), Disp: 28 tablet, Rfl: 0     naproxen (NAPROSYN) 500 MG tablet, Take 1 tab at onset of headache. Can repeat 1 more tab later in day if needed. Limit to 2 pills per day and no more than 3 days per week. (Patient not taking: Reported on 5/26/2023), Disp: , Rfl:      norethindrone (MICRONOR) 0.35 MG tablet, Take 1 tablet by mouth (Patient not taking: Reported on 5/26/2023), Disp: , Rfl:      ondansetron (ZOFRAN ODT) 4 MG ODT tab, DISSOLVE 1 TABLET IN MOUTH EVERY 6 HOURS AS NEEDED FOR NAUSEA FOR VOMITING  "(Patient not taking: Reported on 5/26/2023), Disp: , Rfl:     ALLERGIES:  Allergies   Allergen Reactions     Bee Anaphylaxis and Swelling     Swelling of arm as a child         FAMILY HISTORY:  Family History   Problem Relation Age of Onset     Cancer Maternal Grandfather         lung     Breast Cancer No family hx of      Colorectal Cancer No family hx of      Coronary Artery Disease No family hx of      Coronary Artery Disease Early Onset No family hx of      Diabetes No family hx of        SOCIAL HISTORY:   Social History     Socioeconomic History     Marital status:    Tobacco Use     Smoking status: Every Day     Types: Vaping Device     Smokeless tobacco: Never   Vaping Use     Vaping Use: Every day     Substances: Nicotine   Substance and Sexual Activity     Alcohol use: Not Currently     Drug use: Not Currently     Sexual activity: Yes     Partners: Male     Birth control/protection: None       PHYSICAL EXAM:    Vitals: /88   Pulse 92   Temp 97.9  F (36.6  C) (Oral)   Resp 16   Ht 1.613 m (5' 3.5\")   Wt 85.3 kg (188 lb)   LMP 07/14/2023 (Exact Date)   SpO2 99%   BMI 32.78 kg/m     Constitutional: Well developed, well nourished. Comfortable appearing.  HEAD:Normocephalic, atraumatic,   Musculoskeletal: Moving all 4 extremities intentionally and without pain. No obvious deformity.  Skin: Warm, dry, no rash. 1.5 cm laceration to pad of right index finger. No bony tenderness. No active bleeding, foreign body, or sign of infection.  Neurologic: Alert & oriented x 3, speech clear, moving all extremities spontaneously   Psychiatric: Affect normal, cooperative.     LAB:  All pertinent labs reviewed and interpreted.  Labs Ordered and Resulted from Time of ED Arrival to Time of ED Departure - No data to display    RADIOLOGY:  No orders to display     PROCEDURES:   PROCEDURE: Laceration Repair   INDICATIONS: Laceration   PROCEDURE PROVIDER: Dr James Campbell   SITE: Right index finger pad "   TYPE/SIZE: simple, clean and no foreign body visualized  1.5 cm (total length)   FUNCTIONAL ASSESSMENT: Distal sensation, circulation and motor intact   MEDICATION: 3 mLs of 1% Lidocaine with epinephrine   PREPARATION: irrigation with Normal saline and Betadine   DEBRIDEMENT: wound explored, no foreign body found and minimal debridement   CLOSURE:  Superficial layer closed with 4 stitches of 5-0 Ethilon simple interrupted    Total number of sutures/staples placed: 4       PROCEDURE: Digital Block   INDICATIONS: Finger laceration   PROCEDURE PROVIDER: Dr James Campbell   SITE: Right index finger (2nd digit)   MEDICATION: 3 mL of 1% Lidocaine with epinephrine   NOTE: The skin overlying the site for injection was prepped with saline and betadine.  Needle was inserted in a standard three point injection pattern.  Each time the area was aspirated and there was no return of blood.  I then injected the medication at the base of the digit.  The patient had good response to the procedure   COMPLICATIONS: Patient tolerated procedure well, without complication           I, Zelalem Vergara, am serving as a scribe to document services personally performed by Dr. James Campbell based on my observation and the provider's statements to me. IJames M.D. attest that Zelalem Vergara is acting in a scribe capacity, has observed my performance of the services and has documented them in accordance with my direction.      James Campbell M.D.  Emergency Medicine  [facility] EMERGENCY ROOM  [address] [address]  [phone]  Dept: [phone]     James Campbell MD  07/23/23 0005

## 2023-07-22 NOTE — ED TRIAGE NOTES
Pt tripped and fell on gravel, lacerated pad of right index finger.  +ETOH     Triage Assessment     Row Name 07/22/23 0031       Triage Assessment (Adult)    Airway WDL WDL       Respiratory WDL    Respiratory WDL WDL       Skin Circulation/Temperature WDL    Skin Circulation/Temperature WDL WDL       Cardiac WDL    Cardiac WDL WDL       Peripheral/Neurovascular WDL    Peripheral Neurovascular WDL WDL       Cognitive/Neuro/Behavioral WDL    Cognitive/Neuro/Behavioral WDL WDL

## 2023-07-22 NOTE — DISCHARGE INSTRUCTIONS
You should have the sutures removed in 7 to 10 days.    Keep the wound clean and dry for the next 24 hours then keep covered with a Band-Aid

## 2023-08-02 ENCOUNTER — VIRTUAL VISIT (OUTPATIENT)
Dept: FAMILY MEDICINE | Facility: CLINIC | Age: 33
End: 2023-08-02
Payer: COMMERCIAL

## 2023-08-02 DIAGNOSIS — F41.1 GENERALIZED ANXIETY DISORDER: Primary | ICD-10-CM

## 2023-08-02 DIAGNOSIS — F33.1 MODERATE EPISODE OF RECURRENT MAJOR DEPRESSIVE DISORDER (H): ICD-10-CM

## 2023-08-02 PROCEDURE — 99214 OFFICE O/P EST MOD 30 MIN: CPT | Mod: VID | Performed by: FAMILY MEDICINE

## 2023-08-02 RX ORDER — HYDROXYZINE HYDROCHLORIDE 25 MG/1
25-50 TABLET, FILM COATED ORAL 3 TIMES DAILY PRN
Qty: 30 TABLET | Refills: 3 | Status: SHIPPED | OUTPATIENT
Start: 2023-08-02

## 2023-08-02 ASSESSMENT — PATIENT HEALTH QUESTIONNAIRE - PHQ9
10. IF YOU CHECKED OFF ANY PROBLEMS, HOW DIFFICULT HAVE THESE PROBLEMS MADE IT FOR YOU TO DO YOUR WORK, TAKE CARE OF THINGS AT HOME, OR GET ALONG WITH OTHER PEOPLE: VERY DIFFICULT
SUM OF ALL RESPONSES TO PHQ QUESTIONS 1-9: 21
SUM OF ALL RESPONSES TO PHQ QUESTIONS 1-9: 21

## 2023-08-02 ASSESSMENT — ANXIETY QUESTIONNAIRES
IF YOU CHECKED OFF ANY PROBLEMS ON THIS QUESTIONNAIRE, HOW DIFFICULT HAVE THESE PROBLEMS MADE IT FOR YOU TO DO YOUR WORK, TAKE CARE OF THINGS AT HOME, OR GET ALONG WITH OTHER PEOPLE: VERY DIFFICULT
5. BEING SO RESTLESS THAT IT IS HARD TO SIT STILL: SEVERAL DAYS
3. WORRYING TOO MUCH ABOUT DIFFERENT THINGS: NEARLY EVERY DAY
2. NOT BEING ABLE TO STOP OR CONTROL WORRYING: NEARLY EVERY DAY
7. FEELING AFRAID AS IF SOMETHING AWFUL MIGHT HAPPEN: MORE THAN HALF THE DAYS
6. BECOMING EASILY ANNOYED OR IRRITABLE: NEARLY EVERY DAY
1. FEELING NERVOUS, ANXIOUS, OR ON EDGE: NEARLY EVERY DAY
GAD7 TOTAL SCORE: 17
GAD7 TOTAL SCORE: 17
4. TROUBLE RELAXING: MORE THAN HALF THE DAYS

## 2023-08-02 NOTE — PROGRESS NOTES
Deanna is a 33 year old who is being evaluated via a billable video visit.      How would you like to obtain your AVS? MyChart  If the video visit is dropped, the invitation should be resent by: Text to cell phone: [phone]  Will anyone else be joining your video visit? No      Assessment/Plan:    Generalized anxiety disorder  Moderate episode of recurrent major depressive disorder (H)  Pt reports hx anxiety/depression, has been off medication for over a year but now symptoms worsening, has had thoughts of self harm but has not acted on these or made a plan for self harm. PHQ9/GAD7 scores as below. Pt interested in restarting sertraline which was helpful in the past - see AVS for dose plan. Will also start hydroxyzine PRN for panic attacks. Crisis resources provided in AVS. Pt declines therapy referral at this time. She will reach out with any questions/concerns, otherwise has follow-up scheduled with PCP in approx 6 weeks.  - sertraline (ZOLOFT) 50 MG tablet  Dispense: 60 tablet; Refill: 1  - hydrOXYzine (ATARAX) 25 MG tablet  Dispense: 30 tablet; Refill: 3    Follow up: as scheduled 9/25 with PCP    Dione Etienne MD  [facility]      Subjective:   Deanna Hernandez is a 33 year old female being seen today via video visit for mood concerns    -reports hx anxiety/depression in the past but no meds since last pregnancy/last year  -lately feeling more anxiety/depression symptoms - some stress/triggers (friends with BPD/overdose)  -starting to have thoughts about self harm - no plan/didn't act on it  -having panic attacks - shaking/can't think straight - usually in evenings when trying to wind down, mind races - most nights  -hx zoloft - thinks was 100mg, no issues/side effects (though did have some GI issues from this) - has been helpful in the past  -hx therapy - unsure about restarting  -not currently breastfeeding  -previously tried hydroxyzine and xanax    Answers submitted by the patient for  this visit:  Patient Health Questionnaire (Submitted on 8/2/2023)  If you checked off any problems, how difficult have these problems made it for you to do your work, take care of things at home, or get along with other people?: Very difficult  PHQ9 TOTAL SCORE: 21  JOAN-7 (Submitted on 8/2/2023)  JOAN 7 TOTAL SCORE: 17  Depression / Anxiety Questionnaire (Submitted on 8/2/2023)  Chief Complaint: Chronic problems general questions HPI Form  Depression/Anxiety: Depression & Anxiety  Depression & Anxiety (Submitted on 8/2/2023)  Chief Complaint: Chronic problems general questions HPI Form  Status since last visit:: bad  Anxiety since last: : bad  Other associated symptoms of depression:: No  Other associated symotome: : No  Significant life event: : relationship concerns  Anxious:: Yes  Current substance use:: No  General Questionnaire (Submitted on 8/2/2023)  Chief Complaint: Chronic problems general questions HPI Form  How many servings of fruits and vegetables do you eat daily?: 0-1  On average, how many sweetened beverages do you drink each day (Examples: soda, juice, sweet tea, etc.  Do NOT count diet or artificially sweetened beverages)?: 2  How many minutes a day do you exercise enough to make your heart beat faster?: 9 or less  How many days a week do you exercise enough to make your heart beat faster?: 3 or less  How many days per week do you miss taking your medication?: 0    Patient Active Problem List   Diagnosis     Asymptomatic microscopic hematuria     Castleman disease (H)     Recurrent major depression in complete remission (H)     Generalized anxiety disorder     Intermetatarsal bursitis     Migraine without aura and without status migrainosus, not intractable     PCOS (polycystic ovarian syndrome)     Nicotine dependence due to vaping non-tobacco product     Peroneal tendinitis, unspecified laterality     Skin lesion of foot     Past Medical History:   Diagnosis Date     Normal spontaneous vaginal  delivery 11/4/2019     Past Surgical History:   Procedure Laterality Date     BIOPSY/REMOVAL, LYMPH NODE(S)      left neck     CERVICAL FUSION       CHOLECYSTECTOMY       Current Outpatient Medications   Medication     acetaminophen (TYLENOL) 500 MG tablet     hydrOXYzine (ATARAX) 25 MG tablet     ibuprofen (ADVIL/MOTRIN) 600 MG tablet     sertraline (ZOLOFT) 50 MG tablet     acetaminophen-codeine (TYLENOL W/CODEINE #3) 300-30 MG per tablet     Butalbital-APAP-Caffeine -40 MG CAPS     EPINEPHrine (ANY BX GENERIC EQUIV) 0.3 MG/0.3ML injection 2-pack     naproxen (NAPROSYN) 500 MG tablet     naproxen (NAPROSYN) 500 MG tablet     norethindrone (MICRONOR) 0.35 MG tablet     ondansetron (ZOFRAN ODT) 4 MG ODT tab     No current facility-administered medications for this visit.     Allergies   Allergen Reactions     Bee Anaphylaxis and Swelling     Swelling of arm as a child       Social History     Socioeconomic History     Marital status:      Spouse name: Not on file     Number of children: Not on file     Years of education: Not on file     Highest education level: Not on file   Occupational History     Not on file   Tobacco Use     Smoking status: Every Day     Types: Vaping Device     Smokeless tobacco: Never   Vaping Use     Vaping Use: Every day     Substances: Nicotine   Substance and Sexual Activity     Alcohol use: Not Currently     Drug use: Not Currently     Sexual activity: Yes     Partners: Male     Birth control/protection: None   Other Topics Concern     Not on file   Social History Narrative     Not on file     Social Determinants of Health     Financial Resource Strain: Not on file   Food Insecurity: Not on file   Transportation Needs: Not on file   Physical Activity: Not on file   Stress: Not on file   Social Connections: Not on file   Intimate Partner Violence: Not on file   Housing Stability: Not on file     Family History   Problem Relation Age of Onset     Cancer Maternal Grandfather          lung     Breast Cancer No family hx of      Colorectal Cancer No family hx of      Coronary Artery Disease No family hx of      Coronary Artery Disease Early Onset No family hx of      Diabetes No family hx of      Review of systems is as stated in HPI, and the remainder of system review is otherwise negative.    Objective:     LMP 07/14/2023 (Exact Date)     General appearance: awake, NAD  HEENT: atraumatic, normocephalic  Lungs: breathing comfortably on room air, no cough  Neuro: alert, oriented x3, CNs grossly intact, no focal deficits appreciated  Psych: normal mood/affect/behavior, answering questions appropriately, linear thought process    Video-Visit Details    Type of service:  Video Visit   Video Start Time: 5:38 PM  Video End Time:5:54 PM  Originating Location (pt. Location): Home  Distant Location (provider location):  On-site  Platform used for Video Visit: Js

## 2023-08-02 NOTE — PATIENT INSTRUCTIONS
Start 1/2 tablet zoloft (25mg daily) for 1 week  Then increase to 1 tablet (50mg) daily for 1 week  Then can increase to 100mg (2 tablets) daily if needed and no side effects    For panic attacks can use 1-2 tablets of hydroxyzine as needed (can take up to three times daily)      Below are resources in case you experience an increase in symptoms or feel you are in crisis:     Mental Health Crisis Lines    [facility]:     Adult Crisis Line  ([phone])    Children's Crisis Line ([phone])  [facility]:  Crisis Connection  ([phone])      Urgent Care  ([phone])   [address] [place] [address]     Provides mental health crisis assessments and Rule 25 assessments   Walk-in appointments available     Walk-in Counseling - [facility]     ([phone])   [facility]  ([phone])     Acute Psychiatric Emergency / Crisis  If you are having an acute psychiatric emergency, please call 911 or have someone drive you directly to a hospital for further evaluation.    [facility]  [phone]     [facility] [phone] (Emergency Psychiatric Evaluation)     Acute Psychiatric Services - [facility]  24 hour crisis walk-in and crisis line  [address] [place], [place]  [phone]    Crisis Text Line: Text [place] to 667485. Free support at your fingertips 24/7  People who text [place] to [phone] will be connected with a counselor. Crisis Text Line is available 24 hours a day, seven days a week.

## 2023-09-05 ENCOUNTER — E-VISIT (OUTPATIENT)
Dept: FAMILY MEDICINE | Facility: CLINIC | Age: 33
End: 2023-09-05
Payer: COMMERCIAL

## 2023-09-05 DIAGNOSIS — Z86.69 HISTORY OF MIGRAINE DURING PREGNANCY: Primary | ICD-10-CM

## 2023-09-05 DIAGNOSIS — Z87.59 HISTORY OF MIGRAINE DURING PREGNANCY: Primary | ICD-10-CM

## 2023-09-05 DIAGNOSIS — G43.909 MIGRAINE WITHOUT STATUS MIGRAINOSUS, NOT INTRACTABLE, UNSPECIFIED MIGRAINE TYPE: ICD-10-CM

## 2023-09-05 DIAGNOSIS — Z33.1 PREGNANCY, INCIDENTAL: ICD-10-CM

## 2023-09-05 PROCEDURE — 99422 OL DIG E/M SVC 11-20 MIN: CPT | Performed by: FAMILY MEDICINE

## 2023-09-05 RX ORDER — ACETAMINOPHEN 500 MG
1000 TABLET ORAL DAILY PRN
Qty: 100 TABLET | Refills: 0 | Status: SHIPPED | OUTPATIENT
Start: 2023-09-05

## 2023-09-05 RX ORDER — METOCLOPRAMIDE 10 MG/1
TABLET ORAL
Qty: 30 TABLET | Refills: 0 | Status: SHIPPED | OUTPATIENT
Start: 2023-09-05

## 2023-09-05 NOTE — PATIENT INSTRUCTIONS
Thank you for choosing us for your care. I have placed an order for a prescription so that you can start treatment. View your full visit summary for details by clicking on the link below. Your pharmacist will able to address any questions you may have about the medication.     If you're not feeling better within 5-7 days, please schedule an appointment.  You can schedule an appointment right here in [facility], or call [phone]  If the visit is for the same symptoms as your eVisit, we'll refund the cost of your eVisit if seen within seven days.    Deanna,    I have sent the Reglan to be taken with 1000 mg of Tylenol once daily as needed for migraine.  Once you are no longer pregnant, you could return to previous treatments like the butalbital-APAP-caffeine or a trial of other abortive treatments (I know Imitrex and Maxalt didn't work well, but there are other options).  If you are not seeing a headache specialist, we can discuss at your scheduled visit later this month if needed.    KHUSHI Jones MD

## 2023-10-11 ENCOUNTER — MYC MEDICAL ADVICE (OUTPATIENT)
Dept: FAMILY MEDICINE | Facility: CLINIC | Age: 33
End: 2023-10-11
Payer: COMMERCIAL

## 2023-10-11 NOTE — LETTER
October 11, 2023      Deanna Hernandez  [address] [place] [address]        To Whom It May Concern:    Deanna Hernandez is a patient under my care. She has had a cervical spine fusion, and would benefit from an office chair with a headrest to support her neck.      Sincerely,        Nancy Jones MD

## 2023-10-31 ENCOUNTER — E-VISIT (OUTPATIENT)
Dept: FAMILY MEDICINE | Facility: CLINIC | Age: 33
End: 2023-10-31
Payer: COMMERCIAL

## 2023-10-31 DIAGNOSIS — R05.1 ACUTE COUGH: Primary | ICD-10-CM

## 2023-10-31 PROCEDURE — 99207 PR NON-BILLABLE SERV PER CHARTING: CPT | Performed by: FAMILY MEDICINE

## 2023-11-01 NOTE — PATIENT INSTRUCTIONS
Dear Deanna,    We are sorry you are not feeling well. Based on the responses you provided, it is recommended that you be seen in-person in clinic so we can better evaluate your symptoms. Please schedule this visit in [employer]. You will have a Schedule Now button in [employer] to help with scheduling this appointment. Otherwise, you can call [phone] to schedule an appointment.     Nida Huerta!  I'm sorry you're not feeling well, but based on your responses, it's a bit unclear what you desire from the e-visit.  It is probably best to discuss your symptoms/concerns in person to decide on the best course of treatment or further testing.    You will not be charged for this eVisit. Thank you for trusting us with your care.     Nancy Jones MD

## 2023-12-31 ENCOUNTER — HEALTH MAINTENANCE LETTER (OUTPATIENT)
Age: 33
End: 2023-12-31

## 2024-01-30 ASSESSMENT — ANXIETY QUESTIONNAIRES
7. FEELING AFRAID AS IF SOMETHING AWFUL MIGHT HAPPEN: MORE THAN HALF THE DAYS
2. NOT BEING ABLE TO STOP OR CONTROL WORRYING: SEVERAL DAYS
1. FEELING NERVOUS, ANXIOUS, OR ON EDGE: MORE THAN HALF THE DAYS
5. BEING SO RESTLESS THAT IT IS HARD TO SIT STILL: MORE THAN HALF THE DAYS
GAD7 TOTAL SCORE: 13
8. IF YOU CHECKED OFF ANY PROBLEMS, HOW DIFFICULT HAVE THESE MADE IT FOR YOU TO DO YOUR WORK, TAKE CARE OF THINGS AT HOME, OR GET ALONG WITH OTHER PEOPLE?: VERY DIFFICULT
GAD7 TOTAL SCORE: 13
4. TROUBLE RELAXING: MORE THAN HALF THE DAYS
6. BECOMING EASILY ANNOYED OR IRRITABLE: NEARLY EVERY DAY
GAD7 TOTAL SCORE: 13
IF YOU CHECKED OFF ANY PROBLEMS ON THIS QUESTIONNAIRE, HOW DIFFICULT HAVE THESE PROBLEMS MADE IT FOR YOU TO DO YOUR WORK, TAKE CARE OF THINGS AT HOME, OR GET ALONG WITH OTHER PEOPLE: VERY DIFFICULT
3. WORRYING TOO MUCH ABOUT DIFFERENT THINGS: SEVERAL DAYS
7. FEELING AFRAID AS IF SOMETHING AWFUL MIGHT HAPPEN: MORE THAN HALF THE DAYS

## 2024-01-30 ASSESSMENT — PATIENT HEALTH QUESTIONNAIRE - PHQ9
10. IF YOU CHECKED OFF ANY PROBLEMS, HOW DIFFICULT HAVE THESE PROBLEMS MADE IT FOR YOU TO DO YOUR WORK, TAKE CARE OF THINGS AT HOME, OR GET ALONG WITH OTHER PEOPLE: VERY DIFFICULT
SUM OF ALL RESPONSES TO PHQ QUESTIONS 1-9: 12
SUM OF ALL RESPONSES TO PHQ QUESTIONS 1-9: 12

## 2024-01-30 NOTE — COMMUNITY RESOURCES LIST (ENGLISH)
01/30/2024   [facility]  N/A  For questions about this resource list or additional care needs, please contact your primary care clinic or care manager.  Phone: [phone]   Email: N/A   Address: [address] [place] [id number]   Hours: N/A        Food and Nutrition       Food pantry  1  [employer] Distance: 1.88 miles      In-Person, Delivery, Pickup   [address] [place] [address]  Language: English, South Korean  Hours: Mon - Fri 8:45 AM - 11:30 AM , Mon - Fri 1:00 PM - 3:30 PM  Fees: Free   Phone: (398) 841-2226 Email: info@[employer] Website: http://www.[employer].Friendemic     2  [facility] - Food Distribution Program Distance: 2.97 miles      In-Person   [address] [place] [address]  Language: English, Hmong, South Korean  Hours: Tue 3:00 PM - 5:00 PM  Fees: Free   Phone: (163) 877-1467 Email: info@[employer] Website: http://[facility].org/programs/kxtezg-lwksnxhhc-iaqvyj/     SNAP application assistance  3  [facility] Outreach Distance: 3.5 miles      Phone/Virtual   [address] [place] [address]  Language: Romanian, English, Hmong, Viktoriya, South Korean  Hours: Mon - Fri 10:00 AM - 12:00 PM , Mon - Fri 2:00 PM - 4:00 PM  Fees: Free   Phone: (399) 537-3165 Website: https://[facility].org/     4  [employer] [facility] Distance: 4.46 miles      In-Person   [address] [place] [address]  Language: English, South Korean  Hours: Mon - Fri 8:30 AM - 5:00 PM  Fees: Free   Phone: (896) 511-3002 Email: info@[employer].org Website: http://www.[employer].org     Soup kitchen or free meals  5  [facility] - [employer] Meals Distance: 2.1 miles      Pickup   [address] [place] [address]  Language: English  Hours: Mon 3:00 PM - 8:00 PM , Tue - Fri 5:00 AM - 8:00 PM , Sat 7:00 AM - 2:00 PM  Fees: Free   Phone: (164)  801-2920 Website: https://communityed.[facility].org/     6  [facility] - [employer] Distance: 3.94 miles      [address] [address]  Language: English  Hours: Mon - Fri 5:00 PM - 6:00 PM  Fees: Free   Phone: (854) 620-1312 Email: ubaldo@[facility].[employer] TV Website: http://www.[facility].org/          Important Numbers & Websites       Emergency Services   [address]  [place] [address]  Poison Control   (646) 350-7696  Suicide Prevention Lifeline   (231) 656-3830 (TALK)  Child Abuse Hotline   (878) 992-9089 (4-A-Child)  Sexual Assault Hotline   (106) 116-9801 (HOPE)  National Runaway Safeline   (963) 436-1913 (RUNAWAY)  All-Options Talkline   (130) 860-2765  Substance Abuse Referral   (341) 914-4137 (HELP)

## 2024-01-31 ENCOUNTER — OFFICE VISIT (OUTPATIENT)
Dept: FAMILY MEDICINE | Facility: CLINIC | Age: 34
End: 2024-01-31
Payer: COMMERCIAL

## 2024-01-31 VITALS
SYSTOLIC BLOOD PRESSURE: 128 MMHG | TEMPERATURE: 98.6 F | HEART RATE: 98 BPM | RESPIRATION RATE: 16 BRPM | OXYGEN SATURATION: 98 % | DIASTOLIC BLOOD PRESSURE: 68 MMHG | HEIGHT: 65 IN | WEIGHT: 205.6 LBS | BODY MASS INDEX: 34.26 KG/M2

## 2024-01-31 DIAGNOSIS — N64.4 BREAST PAIN: ICD-10-CM

## 2024-01-31 DIAGNOSIS — N63.11 MASS OF UPPER OUTER QUADRANT OF RIGHT BREAST: Primary | ICD-10-CM

## 2024-01-31 PROCEDURE — 99213 OFFICE O/P EST LOW 20 MIN: CPT | Performed by: NURSE PRACTITIONER

## 2024-01-31 ASSESSMENT — PAIN SCALES - GENERAL: PAINLEVEL: NO PAIN (0)

## 2024-01-31 NOTE — PROGRESS NOTES
"  Sujatha Huerta is a 33 year old, presenting for the following health issues:  RECHECK (Breast Check right breast, small lump, pain and the filling of being stuck with hot poker. 2 weeks.)      1/31/2024     7:48 AM   Additional Questions   Roomed by  LPN     History of Present Illness       Reason for visit:  Breast pain. Small lump in breast. Right side.  Symptom onset:  1-2 weeks ago  Symptoms include:  Breast pain. Small lump in breast.  Symptom intensity:  Mild  Symptom progression:  Staying the same  Had these symptoms before:  No  What makes it worse:  No  What makes it better:  Tylenol. Ibuprofen.    She eats 2-3 servings of fruits and vegetables daily.She consumes 4 sweetened beverage(s) daily.She exercises with enough effort to increase her heart rate 9 or less minutes per day.  She exercises with enough effort to increase her heart rate 3 or less days per week. She is missing 4 dose(s) of medications per week.  She is not taking prescribed medications regularly due to side effects and remembering to take.     Review of Systems  Constitutional, neuro, ENT, endocrine, pulmonary, cardiac, gastrointestinal, genitourinary, musculoskeletal, integument and psychiatric systems are negative, except as otherwise noted.  Right breast pain and lump x 2 weeks.  Sharp pain, \"feels like a hot poker\".  Pain is intermittent.  Otherwise feeling well.       Objective    /68 (BP Location: Left arm, Patient Position: Sitting, Cuff Size: Adult Regular)   Pulse 98   Temp 98.6  F (37  C) (Oral)   Resp 16   Ht 1.651 m (5' 5\")   Wt 93.3 kg (205 lb 9.6 oz)   LMP 01/04/2024   SpO2 98%   Breastfeeding No   BMI 34.21 kg/m    Body mass index is 34.21 kg/m .  Physical Exam   GENERAL: alert and no distress  RESP: respirations even, non-labored  BREAST: breasts are lumpy bilaterally- firmer lump right upper outer quadrant.  No nipple discharge or adenopathy.   MS: no gross musculoskeletal defects noted, no " edema  PSYCH: mentation appears normal, affect normal/bright    A/P:  (N63.11) Mass of upper outer quadrant of right breast  (primary encounter diagnosis)  Comment:   Plan: MA Diagnostic Bilateral w/ Eddie, US Breast         Right Limited 1-3 Quadrants            (N64.4) Breast pain  Comment:   Plan: MA Diagnostic Bilateral w/ Eddie, US Breast         Right Limited 1-3 Quadrants                Signed Electronically by: Bea Bolanos CNP

## 2024-02-12 ENCOUNTER — HOSPITAL ENCOUNTER (OUTPATIENT)
Dept: MAMMOGRAPHY | Facility: CLINIC | Age: 34
Discharge: HOME | End: 2024-02-12
Attending: NURSE PRACTITIONER
Payer: COMMERCIAL

## 2024-02-12 DIAGNOSIS — N63.11 MASS OF UPPER OUTER QUADRANT OF RIGHT BREAST: ICD-10-CM

## 2024-02-12 DIAGNOSIS — N64.4 BREAST PAIN: ICD-10-CM

## 2024-02-12 PROCEDURE — 76642 ULTRASOUND BREAST LIMITED: CPT | Mod: RT

## 2024-02-12 PROCEDURE — 77062 BREAST TOMOSYNTHESIS BI: CPT

## 2024-02-17 ENCOUNTER — OFFICE VISIT (OUTPATIENT)
Dept: FAMILY MEDICINE | Facility: CLINIC | Age: 34
End: 2024-02-17
Payer: COMMERCIAL

## 2024-02-17 VITALS
BODY MASS INDEX: 33.78 KG/M2 | DIASTOLIC BLOOD PRESSURE: 101 MMHG | TEMPERATURE: 98.4 F | OXYGEN SATURATION: 97 % | WEIGHT: 203 LBS | HEART RATE: 92 BPM | RESPIRATION RATE: 15 BRPM | SYSTOLIC BLOOD PRESSURE: 139 MMHG

## 2024-02-17 DIAGNOSIS — H60.11 CELLULITIS OF RIGHT PINNA: Primary | ICD-10-CM

## 2024-02-17 PROCEDURE — 99213 OFFICE O/P EST LOW 20 MIN: CPT | Performed by: PHYSICIAN ASSISTANT

## 2024-02-17 RX ORDER — CEFADROXIL 500 MG/1
500 CAPSULE ORAL 2 TIMES DAILY
Qty: 20 CAPSULE | Refills: 0 | Status: SHIPPED | OUTPATIENT
Start: 2024-02-17 | End: 2024-02-27

## 2024-02-17 NOTE — PROGRESS NOTES
Patient presents with:  Ear Problem: Possible skin infection      Clinical Decision Making:  Patient had redness swelling and warmth to touch over the helix and antihelix of the right ear.  Treatment with internal antibiotic with cefadroxil with hot packs 3 times per day. Expected course of resolution and indication for return was gone over and questions were answered to patient/parent's satisfaction before discharge.          ICD-10-CM    1. Cellulitis of right pinna  H60.11 cefadroxil (DURICEF) 500 MG capsule          Patient Instructions   Hot packs to the area 3 times per day 20 minutes on each hour.  Do not fall asleep with the hot packs on the skin.  Take the antibiotic as written  Symptoms should not be getting worse over the next 24 hours after taking the antibiotic.  Should have start of improvement after 48 hours on the antibiotic  Use of probiotics to help prevent diarrhea.  Separate dosing of the antibiotic from the probiotic by 2 hours or they are not in the stomach at the same time.  Use of over-the-counter yogurt for helping with stomach upset and diarrhea.  Return to clinic if not getting good resolution or if new symptoms or concerns arise.        HPI:  Deanna Hernandez is a 33 year old female who presents today for right ear redness swelling and pain.  Patient has had a history of cellulitis in that right ear in the past.  Over the last 2 days she has developed redness swelling and pain but no discharge.  Has not had constitutional symptoms to include fever chills night sweats or fatigue and no swelling of the post or preauricular lymph nodes.     History obtained from chart review and the patient.    Problem List:  2022-10: Peroneal tendinitis, unspecified laterality  2022-10: Skin lesion of foot  2022-09: Castleman disease (H)  2022-09: Nicotine dependence due to vaping non-tobacco product  2022-01: Asymptomatic microscopic hematuria  2020-02: Intermetatarsal bursitis  2019-11: Normal spontaneous  vaginal delivery  2019-08: Migraine without aura and without status migrainosus, not   intractable  2018-11: Recurrent major depression in complete remission (H24)  2018-11: Generalized anxiety disorder  2014-12: PCOS (polycystic ovarian syndrome)      Past Medical History:   Diagnosis Date    Normal spontaneous vaginal delivery 11/4/2019       Social History     Tobacco Use    Smoking status: Every Day     Types: Vaping Device    Smokeless tobacco: Never   Substance Use Topics    Alcohol use: Not Currently       Review of Systems  As above in HPI otherwise negative.    Vitals:    02/17/24 1125   BP: (!) 139/101   Pulse: 92   Resp: 15   Temp: 98.4  F (36.9  C)   SpO2: 97%   Weight: 92.1 kg (203 lb)       General: Patient is resting comfortably no acute distress is afebrile  HEENT: Head is normocephalic atraumatic   Right external pinna over the helix and antihelix is slightly edematous warm to touch and erythematous.   No noted pre or postauricular lymphadenopathy  And external auditory canals not erythematous or irritated.  TM is clear  Neck no right cervical lymphadenopathy palpated  Skin: Without rash non-diaphoretic    Physical Exam    At the end of the encounter, I discussed results, diagnosis, medications. Discussed red flags for immediate return to clinic/ER, as well as indications for follow up if no improvement. Patient understood and agreed to plan. Patient was stable for discharge.

## 2024-02-17 NOTE — PATIENT INSTRUCTIONS
Hot packs to the area 3 times per day 20 minutes on each hour.  Do not fall asleep with the hot packs on the skin.  Take the antibiotic as written  Symptoms should not be getting worse over the next 24 hours after taking the antibiotic.  Should have start of improvement after 48 hours on the antibiotic  Use of probiotics to help prevent diarrhea.  Separate dosing of the antibiotic from the probiotic by 2 hours or they are not in the stomach at the same time.  Use of over-the-counter yogurt for helping with stomach upset and diarrhea.  Return to clinic if not getting good resolution or if new symptoms or concerns arise.

## 2024-02-27 ENCOUNTER — E-VISIT (OUTPATIENT)
Dept: FAMILY MEDICINE | Facility: CLINIC | Age: 34
End: 2024-02-27
Payer: COMMERCIAL

## 2024-02-27 DIAGNOSIS — R19.7 DIARRHEA OF PRESUMED INFECTIOUS ORIGIN: Primary | ICD-10-CM

## 2024-02-27 PROCEDURE — 99207 PR NO CHARGE LOS: CPT | Performed by: FAMILY MEDICINE

## 2024-02-27 NOTE — PATIENT INSTRUCTIONS
Diarrhea: Care Instructions  Overview     Diarrhea is loose, watery stools (bowel movements). The exact cause is often hard to find. Sometimes diarrhea is your body's way of getting rid of what caused an upset stomach. Viruses, food poisoning, and many medicines can cause diarrhea. Some people get diarrhea in response to emotional stress, anxiety, or certain foods.  Almost everyone has diarrhea now and then. It usually isn't serious, and your stools will return to normal soon. The important thing to do is replace the fluids you have lost, so you can prevent dehydration.  The doctor has checked you carefully, but problems can develop later. If you notice any problems or new symptoms, get medical treatment right away.  Follow-up care is a key part of your treatment and safety. Be sure to make and go to all appointments, and call your doctor if you are having problems. It's also a good idea to know your test results and keep a list of the medicines you take.  How can you care for yourself at home?  Watch for signs of dehydration, which means your body has lost too much water. Dehydration is a serious condition and should be treated right away. Signs of dehydration are:  Increasing thirst and dry eyes and mouth.  Feeling faint or lightheaded.  A smaller amount of urine than normal.  To prevent dehydration, drink plenty of fluids. Choose water and other clear liquids until you feel better. If you have kidney, heart, or liver disease and have to limit fluids, talk with your doctor before you increase the amount of fluids you drink.  When you feel like eating, start with small amounts of food.  The doctor may recommend that you take over-the-counter medicine, such as loperamide (Imodium). Read and follow all instructions on the label. Do not use this medicine if you have bloody diarrhea, a high fever, or other signs of serious illness. Call your doctor if you think you are having a problem with your medicine.  When should  "you call for help?   Call 911 anytime you think you may need emergency care. For example, call if:    You passed out (lost consciousness).     Your stools are maroon or very bloody.   Call your doctor now or seek immediate medical care if:    You are dizzy or lightheaded, or you feel like you may faint.     Your stools are black and look like tar, or they have streaks of blood.     You have new or worse belly pain.     You have symptoms of dehydration, such as:  Dry eyes and a dry mouth.  Passing only a little urine.  Cannot keep fluids down.     You have a new or higher fever.   Watch closely for changes in your health, and be sure to contact your doctor if:    Your diarrhea is getting worse.     You see pus in the diarrhea.     You are not getting better after 2 days (48 hours).   Where can you learn more?  Go to https://www."[employer]/patiented  Enter W335 in the search box to learn more about \"Diarrhea: Care Instructions.\"  Current as of: March 21, 2023               Content Version: 13.8    0567-2152 [employer].   Care instructions adapted under license by your healthcare professional. If you have questions about a medical condition or this instruction, always ask your healthcare professional. [employer] disclaims any warranty or liability for your use of this information.      Nida Huerta,    If your illness started with vomiting, this is likely viral.  Diarrhea can persist for several days after vomiting has resolved.  I would recommend a brat diet (bananas, rice, applesauce, toast), soft foods or liquids like Jell-O, broth, only cooked vegetables.  Consider drinking fluids that have electrolytes in them.  I would not recommend antidiarrheal medications, as this can actually prolong the course of diarrhea.  If you develop fevers again, severe abdominal pain, or blood in the stools, would recommend a clinic visit for further evaluation and labs.    KHUSHI Jones MD  "

## 2024-03-07 ENCOUNTER — APPOINTMENT (OUTPATIENT)
Dept: CT IMAGING | Facility: CLINIC | Age: 34
End: 2024-03-07
Attending: EMERGENCY MEDICINE
Payer: COMMERCIAL

## 2024-03-07 ENCOUNTER — HOSPITAL ENCOUNTER (EMERGENCY)
Facility: CLINIC | Age: 34
Discharge: HOME | End: 2024-03-07
Admitting: EMERGENCY MEDICINE
Payer: COMMERCIAL

## 2024-03-07 VITALS
HEART RATE: 95 BPM | RESPIRATION RATE: 16 BRPM | SYSTOLIC BLOOD PRESSURE: 147 MMHG | OXYGEN SATURATION: 98 % | HEIGHT: 64 IN | BODY MASS INDEX: 35 KG/M2 | WEIGHT: 205 LBS | DIASTOLIC BLOOD PRESSURE: 100 MMHG | TEMPERATURE: 98 F

## 2024-03-07 DIAGNOSIS — J02.9 SORE THROAT: ICD-10-CM

## 2024-03-07 LAB
ANION GAP SERPL CALCULATED.3IONS-SCNC: 11 MMOL/L (ref 7–15)
BUN SERPL-MCNC: 9.2 MG/DL (ref 6–20)
CALCIUM SERPL-MCNC: 8.6 MG/DL (ref 8.6–10)
CHLORIDE SERPL-SCNC: 107 MMOL/L (ref 98–107)
CREAT SERPL-MCNC: 0.84 MG/DL (ref 0.51–0.95)
DEPRECATED HCO3 PLAS-SCNC: 25 MMOL/L (ref 22–29)
EGFRCR SERPLBLD CKD-EPI 2021: >90 ML/MIN/1.73M2
ERYTHROCYTE [DISTWIDTH] IN BLOOD BY AUTOMATED COUNT: 12.9 % (ref 10–15)
FLUAV RNA SPEC QL NAA+PROBE: NEGATIVE
FLUBV RNA RESP QL NAA+PROBE: NEGATIVE
GLUCOSE SERPL-MCNC: 85 MG/DL (ref 70–99)
GROUP A STREP BY PCR: NOT DETECTED
HCG UR QL: NEGATIVE
HCT VFR BLD AUTO: 39 % (ref 35–47)
HGB BLD-MCNC: 13.3 G/DL (ref 11.7–15.7)
MCH RBC QN AUTO: 29.6 PG (ref 26.5–33)
MCHC RBC AUTO-ENTMCNC: 34.1 G/DL (ref 31.5–36.5)
MCV RBC AUTO: 87 FL (ref 78–100)
PLATELET # BLD AUTO: 450 10E3/UL (ref 150–450)
POTASSIUM SERPL-SCNC: 3.7 MMOL/L (ref 3.4–5.3)
RBC # BLD AUTO: 4.49 10E6/UL (ref 3.8–5.2)
RSV RNA SPEC NAA+PROBE: NEGATIVE
SARS-COV-2 RNA RESP QL NAA+PROBE: NEGATIVE
SODIUM SERPL-SCNC: 143 MMOL/L (ref 135–145)
WBC # BLD AUTO: 6.5 10E3/UL (ref 4–11)

## 2024-03-07 PROCEDURE — 80048 BASIC METABOLIC PNL TOTAL CA: CPT | Performed by: EMERGENCY MEDICINE

## 2024-03-07 PROCEDURE — 87637 SARSCOV2&INF A&B&RSV AMP PRB: CPT | Performed by: EMERGENCY MEDICINE

## 2024-03-07 PROCEDURE — 81025 URINE PREGNANCY TEST: CPT | Performed by: EMERGENCY MEDICINE

## 2024-03-07 PROCEDURE — 36415 COLL VENOUS BLD VENIPUNCTURE: CPT | Performed by: EMERGENCY MEDICINE

## 2024-03-07 PROCEDURE — 250N000012 HC RX MED GY IP 250 OP 636 PS 637: Performed by: EMERGENCY MEDICINE

## 2024-03-07 PROCEDURE — 70491 CT SOFT TISSUE NECK W/DYE: CPT

## 2024-03-07 PROCEDURE — 85027 COMPLETE CBC AUTOMATED: CPT | Performed by: EMERGENCY MEDICINE

## 2024-03-07 PROCEDURE — 99285 EMERGENCY DEPT VISIT HI MDM: CPT | Mod: 25

## 2024-03-07 PROCEDURE — 87651 STREP A DNA AMP PROBE: CPT | Performed by: EMERGENCY MEDICINE

## 2024-03-07 PROCEDURE — 250N000011 HC RX IP 250 OP 636: Performed by: EMERGENCY MEDICINE

## 2024-03-07 PROCEDURE — 250N000013 HC RX MED GY IP 250 OP 250 PS 637: Performed by: EMERGENCY MEDICINE

## 2024-03-07 RX ORDER — ACETAMINOPHEN 325 MG/1
975 TABLET ORAL ONCE
Status: COMPLETED | OUTPATIENT
Start: 2024-03-07 | End: 2024-03-07

## 2024-03-07 RX ORDER — IOPAMIDOL 755 MG/ML
90 INJECTION, SOLUTION INTRAVASCULAR ONCE
Status: COMPLETED | OUTPATIENT
Start: 2024-03-07 | End: 2024-03-07

## 2024-03-07 RX ADMIN — ACETAMINOPHEN 975 MG: 325 TABLET ORAL at 19:56

## 2024-03-07 RX ADMIN — IOPAMIDOL 90 ML: 755 INJECTION, SOLUTION INTRAVENOUS at 21:23

## 2024-03-07 RX ADMIN — DEXAMETHASONE INTENSOL 10 MG: 1 SOLUTION, CONCENTRATE ORAL at 19:56

## 2024-03-07 ASSESSMENT — COLUMBIA-SUICIDE SEVERITY RATING SCALE - C-SSRS
6. HAVE YOU EVER DONE ANYTHING, STARTED TO DO ANYTHING, OR PREPARED TO DO ANYTHING TO END YOUR LIFE?: NO
2. HAVE YOU ACTUALLY HAD ANY THOUGHTS OF KILLING YOURSELF IN THE PAST MONTH?: NO
1. IN THE PAST MONTH, HAVE YOU WISHED YOU WERE DEAD OR WISHED YOU COULD GO TO SLEEP AND NOT WAKE UP?: NO

## 2024-03-07 ASSESSMENT — ACTIVITIES OF DAILY LIVING (ADL)
ADLS_ACUITY_SCORE: 35
ADLS_ACUITY_SCORE: 33
ADLS_ACUITY_SCORE: 35

## 2024-03-08 NOTE — ED PROVIDER NOTES
EMERGENCY DEPARTMENT ENCOUNTER      NAME: Deanna Hernandez  AGE: 34 year old female  YOB: 1990  MRN: [id number]  EVALUATION DATE & TIME: No admission date for patient encounter.    PCP: Nancy Jones    ED PROVIDER: Mila Velázquez PA-C      Chief Complaint   Patient presents with    Pharyngitis         FINAL IMPRESSION:  1. Sore throat          MEDICAL DECISION MAKING:    Pertinent Labs & Imaging studies reviewed. (See chart for details)  34 year old female presents to the Emergency Department for evaluation of sore throat. The patient started to have a sore throat yesterday and today pain worsened and she was having some difficulty swallowing due to symptoms and so she was concerned and presented to the emergency department.  On my evaluation, the patient was hypertensive at 173/116 but otherwise vitally stable.  Examination with heart and regular rate and rhythm and lungs are auscultation bilaterally.  Posterior oropharynx with erythema but no significant tonsillar swelling or exudates.  Uvula midline and patient tolerating secretions without difficulty.  No intraoral swelling.  Differential diagnosis included strep, COVID, influenza, RSV, other viral illness, epiglottitis, deep space infection, retropharyngeal abscess.    COVID, influenza RSV testing negative.  Strep testing negative.  Pregnancy test negative.  CBC and BMP without derangement.  CT of the soft tissue of the neck with contrast was independently interpreted myself did not show any obvious abscess or other acute findings.  Formal read of the CT of the soft tissue of the neck without any acute findings.  Patient was given a dose of Decadron and Tylenol here with improvement of symptoms.  Discussed findings and close follow-up with primary care if symptoms or not improving.  At this time, with reassuring evaluation here I do not feel she requires hospitalization and is appropriate for outpatient management.  Patient was agreement  understand with the plan of care and all questions were answered best my ability.  She was discharged in stable condition.    Medical Decision Making  Obtained supplemental history:Supplemental history obtained?: Documented in chart  Reviewed external records: External records reviewed?: No  Care impacted by chronic illness:N/A  Care significantly affected by social determinants of health:N/A  Did you consider but not order tests?: Work up considered but not performed and documented in chart, if applicable  Did you interpret images independently?: Independent interpretation of ECG and images noted in documentation, when applicable.  Consultation discussion with other provider:Did you involve another provider (consultant, , pharmacy, etc.)?: No  Discharge. No recommendations on prescription strength medication(s). See documentation for any additional details.     ED COURSE:  8:08 PM I met with the patient, obtained history, performed an initial exam, and discussed options and plan for diagnostics and treatment here in the ED.    10:03 PM  Patient discharged after being provided with extensive anticipatory guidance and given return precautions, importance of PCP follow-up emphasized.    At the conclusion of the encounter I discussed the results of all of the tests and the disposition. The questions were answered. The patient or family acknowledged understanding and was agreeable with the care plan.     MEDICATIONS GIVEN IN THE EMERGENCY:  Medications   acetaminophen (TYLENOL) tablet 975 mg (975 mg Oral $Given 3/7/24 1956)   dexAMETHasone (DECADRON) (HIGH CONC) solution 10 mg (10 mg Oral $Given 3/7/24 1956)   iopamidol (ISOVUE-370) solution 90 mL (90 mLs Intravenous $Given 3/7/24 2123)       NEW PRESCRIPTIONS STARTED AT TODAY'S ER VISIT  Discharge Medication List as of 3/7/2024 10:06 PM               =================================================================    HPI:    Patient information was obtained from:  "Patient    Use of Interpretor: N/A     Deannareese Hernandez is a 34 year old female with a pertinent history of Castleman's disease who presents to this ED for evaluation of pharyngitis.    Patient reports that starting yesterday she developed a sore throat and difficulty breathing due to her throat pain. States she felt like \"something was inside\" her throat. She has been able to eat and drink but it has been difficult. Tried tylenol without relief.    She denied any nausea,vomiting,difficulty breathing,or chest pain. She has 4 kids who are currently sick with a cough and had been sick with the stomach flu last weekend. But their stomach flu symptoms have since resolved. No other complaints at this time.     REVIEW OF SYSTEMS:  Negative unless otherwise stated in the above HPI.       PAST MEDICAL HISTORY:  Past Medical History:   Diagnosis Date    Normal spontaneous vaginal delivery 11/4/2019       PAST SURGICAL HISTORY:  Past Surgical History:   Procedure Laterality Date    BIOPSY/REMOVAL, LYMPH NODE(S)      left neck    CERVICAL FUSION      CHOLECYSTECTOMY             CURRENT MEDICATIONS:    No current facility-administered medications for this encounter.    Current Outpatient Medications:     acetaminophen (TYLENOL) 500 MG tablet, Take 2 tablets (1,000 mg) by mouth daily as needed for mild pain, Disp: 100 tablet, Rfl: 0    hydrOXYzine (ATARAX) 25 MG tablet, Take 1-2 tablets (25-50 mg) by mouth 3 times daily as needed for anxiety (Patient not taking: Reported on 2/17/2024), Disp: 30 tablet, Rfl: 3    ibuprofen (ADVIL/MOTRIN) 600 MG tablet, Take 600 mg by mouth every 6 hours as needed, Disp: , Rfl:     metoclopramide (REGLAN) 10 MG tablet, Take 1 tab daily as needed for migraine (take with Tylenol) (Patient not taking: Reported on 2/17/2024), Disp: 30 tablet, Rfl: 0    naproxen (NAPROSYN) 500 MG tablet, , Disp: , Rfl:     sertraline (ZOLOFT) 50 MG tablet, Take 1 tablet (50 mg) by mouth daily, Disp: 60 tablet, Rfl: " 1      ALLERGIES:  Allergies   Allergen Reactions    Bee Anaphylaxis and Swelling     Swelling of arm as a child         FAMILY HISTORY:  Family History   Problem Relation Age of Onset    Cancer Maternal Grandfather         lung    Breast Cancer No family hx of     Colorectal Cancer No family hx of     Coronary Artery Disease No family hx of     Coronary Artery Disease Early Onset No family hx of     Diabetes No family hx of        SOCIAL HISTORY:   Social History     Socioeconomic History    Marital status:    Tobacco Use    Smoking status: Every Day     Types: Vaping Device    Smokeless tobacco: Never   Substance and Sexual Activity    Alcohol use: Not Currently    Drug use: Not Currently    Sexual activity: Yes     Partners: Male     Birth control/protection: None     Social Determinants of Health     Financial Resource Strain: Low Risk  (1/30/2024)    Financial Resource Strain     Within the past 12 months, have you or your family members you live with been unable to get utilities (heat, electricity) when it was really needed?: No   Food Insecurity: High Risk (1/30/2024)    Food Insecurity     Within the past 12 months, did you worry that your food would run out before you got money to buy more?: Yes     Within the past 12 months, did the food you bought just not last and you didn t have money to get more?: No   Transportation Needs: Low Risk  (1/30/2024)    Transportation Needs     Within the past 12 months, has lack of transportation kept you from medical appointments, getting your medicines, non-medical meetings or appointments, work, or from getting things that you need?: No   Interpersonal Safety: Low Risk  (1/31/2024)    Interpersonal Safety     Do you feel physically and emotionally safe where you currently live?: Yes     Within the past 12 months, have you been hit, slapped, kicked or otherwise physically hurt by someone?: No     Within the past 12 months, have you been humiliated or emotionally  "abused in other ways by your partner or ex-partner?: No   Housing Stability: Low Risk  (1/30/2024)    Housing Stability     Do you have housing? : Yes     Are you worried about losing your housing?: No       VITALS:  Patient Vitals for the past 24 hrs:   BP Temp Temp src Pulse Resp SpO2 Height Weight   03/07/24 2203 (!) 147/100 98  F (36.7  C) Oral 95 16 98 % -- --   03/07/24 2016 (!) 143/102 -- -- 80 18 98 % -- --   03/07/24 1924 (!) 173/116 97.6  F (36.4  C) Oral 83 22 98 % 1.626 m (5' 4\") 93 kg (205 lb)       PHYSICAL EXAM    Constitutional: Well developed, Well nourished, NAD  HENT: Normocephalic, Atraumatic, Bilateral external ears normal, Oropharynx normal, mucous membranes moist, Nose normal.  Posterior oropharynx with erythema, no tonsillar swelling or exudates.  Uvula midline patient tolerating secretions without difficulty  Neck: Normal range of motion, No tenderness, Supple, No stridor.  Eyes: Eyes track normally throughout exam, Conjunctiva normal, No discharge.   Respiratory: Normal breath sounds, No respiratory distress, No wheezing, Speaks full sentences easily. No cough.  Cardiovascular: Normal heart rate, Regular rhythm, No murmurs, No rubs, No gallops. Chest wall nontender.  GI: Soft, No tenderness, No masses, No flank tenderness. No rebound or guarding.  Musculoskeletal: Good range of motion in all major joints.   Integument: Warm, Dry, No erythema, No rash. No petechiae.  Neurologic: Alert & oriented x 3, Normal motor function, Normal sensory function, No focal deficits noted. Normal gait.  Psychiatric: Affect normal, Judgment normal, Mood normal. Cooperative.    LAB:  All pertinent labs reviewed and interpreted.  Recent Results (from the past 24 hour(s))   Group A Streptococcus PCR Throat Swab    Collection Time: 03/07/24  7:32 PM    Specimen: Throat; Swab   Result Value Ref Range    Group A strep by PCR Not Detected Not Detected   Symptomatic Influenza A/B, RSV, & SARS-CoV2 PCR (COVID-19) " Nasopharyngeal    Collection Time: 03/07/24  7:32 PM    Specimen: Nasopharyngeal; Swab   Result Value Ref Range    Influenza A PCR Negative Negative    Influenza B PCR Negative Negative    RSV PCR Negative Negative    SARS CoV2 PCR Negative Negative   HCG qualitative urine    Collection Time: 03/07/24  8:25 PM   Result Value Ref Range    hCG Urine Qualitative Negative Negative   CBC (+ platelets, no diff)    Collection Time: 03/07/24  8:26 PM   Result Value Ref Range    WBC Count 6.5 4.0 - 11.0 10e3/uL    RBC Count 4.49 3.80 - 5.20 10e6/uL    Hemoglobin 13.3 11.7 - 15.7 g/dL    Hematocrit 39.0 35.0 - 47.0 %    MCV 87 78 - 100 fL    MCH 29.6 26.5 - 33.0 pg    MCHC 34.1 31.5 - 36.5 g/dL    RDW 12.9 10.0 - 15.0 %    Platelet Count 450 150 - 450 10e3/uL   Basic metabolic panel    Collection Time: 03/07/24  8:26 PM   Result Value Ref Range    Sodium 143 135 - 145 mmol/L    Potassium 3.7 3.4 - 5.3 mmol/L    Chloride 107 98 - 107 mmol/L    Carbon Dioxide (CO2) 25 22 - 29 mmol/L    Anion Gap 11 7 - 15 mmol/L    Urea Nitrogen 9.2 6.0 - 20.0 mg/dL    Creatinine 0.84 0.51 - 0.95 mg/dL    GFR Estimate >90 >60 mL/min/1.73m2    Calcium 8.6 8.6 - 10.0 mg/dL    Glucose 85 70 - 99 mg/dL         RADIOLOGY:  Reviewed all pertinent imaging. Please see official radiology report.  Soft tissue neck CT w contrast   Final Result   IMPRESSION:    1.  Normal soft tissue neck CT.          PROCEDURES:   N/A      I, Yecenia De Anda , am serving as a scribe to document services personally performed by Mila Velázquez PA-C based on my observation and the provider's statements to me. I, Mila Velázquez PA-C attest that Yecenia De Anda  is acting in a scribe capacity, has observed my performance of the services and has documented them in accordance with my direction.    Mila Velázquez PA-C  Emergency Medicine  [facility]  3/7/2024       Mila Velázquez PA-C  03/07/24 6898

## 2024-03-08 NOTE — ED TRIAGE NOTES
"To ED per POV    C/o sore throat x 24 hours unrelieved by Tylenol    Also states she had the \"stomach flu\" on Sunday     Triage Assessment (Adult)       Row Name 03/07/24 1925          Triage Assessment    Airway WDL WDL        Respiratory WDL    Respiratory WDL WDL        Skin Circulation/Temperature WDL    Skin Circulation/Temperature WDL WDL        Cardiac WDL    Cardiac WDL WDL        Peripheral/Neurovascular WDL    Peripheral Neurovascular WDL WDL        Cognitive/Neuro/Behavioral WDL    Cognitive/Neuro/Behavioral WDL WDL                     "

## 2024-03-08 NOTE — DISCHARGE INSTRUCTIONS
You were seen and evaluated here in the emergency department for your sore throat.  Fortunately, laboratory evaluation, COVID, influenza, strep and CT of the soft tissue of the neck negative for any acute findings.  You are feeling somewhat improved with medications here and I recommend continuation of Tylenol and ibuprofen at home.  I recommend plenty of fluids and rest.    If you develop worsening symptoms, difficulty swallowing, difficulty breathing, uncontrolled vomiting, significant fevers or swelling or any other concerns please return to the emergency department.    Otherwise, I recommend close follow-up with primary care in the next 3 to 5 days if symptoms or not improving.

## 2024-03-16 ENCOUNTER — OFFICE VISIT (OUTPATIENT)
Dept: FAMILY MEDICINE | Facility: CLINIC | Age: 34
End: 2024-03-16
Payer: COMMERCIAL

## 2024-03-16 VITALS
WEIGHT: 205 LBS | TEMPERATURE: 98.4 F | HEART RATE: 96 BPM | RESPIRATION RATE: 16 BRPM | DIASTOLIC BLOOD PRESSURE: 87 MMHG | SYSTOLIC BLOOD PRESSURE: 127 MMHG | OXYGEN SATURATION: 97 % | BODY MASS INDEX: 35.19 KG/M2

## 2024-03-16 DIAGNOSIS — J36 PERITONSILLAR CELLULITIS: Primary | ICD-10-CM

## 2024-03-16 DIAGNOSIS — H65.03 NON-RECURRENT ACUTE SEROUS OTITIS MEDIA OF BOTH EARS: ICD-10-CM

## 2024-03-16 LAB
DEPRECATED S PYO AG THROAT QL EIA: NEGATIVE
GROUP A STREP BY PCR: NOT DETECTED

## 2024-03-16 PROCEDURE — 87651 STREP A DNA AMP PROBE: CPT | Performed by: FAMILY MEDICINE

## 2024-03-16 PROCEDURE — 99214 OFFICE O/P EST MOD 30 MIN: CPT | Performed by: FAMILY MEDICINE

## 2024-03-16 RX ORDER — FLUTICASONE PROPIONATE 50 MCG
1 SPRAY, SUSPENSION (ML) NASAL 2 TIMES DAILY
Qty: 16 G | Refills: 0 | Status: SHIPPED | OUTPATIENT
Start: 2024-03-16

## 2024-03-16 NOTE — PATIENT INSTRUCTIONS
Start Augmentin 2 times a day for 10 days always take with food to prevent nausea vomiting and will treat strep if the strep test comes back positive       Start Flonase1 spray in each nostril in am and bedtime or congestion/sinus congestion-will also help with fluid behind the ear drums  for 14 days      Start Mucinex -1200 mg daily -the generic is fine -for congestion/sinus congestion-will also help with fluid behind the ear drums for 14 days for 14 days       For pain    Start  Ibuprofen (motrin/advil)  600 mg 3 times a day always take with food for the next 2 to 3 days until pain resolves-maximum dose of ibuprofen is 2400 mg in 24 hours     Can alternate with     Acetaminophen (Tylenol ) 1000 mg 3 times a day for the next 5 to 7 days until pain resolves-maximum dose of acetaminophen (tylenol)  is 3000 mg in 24-hours     Return to clinic or to ER if symptoms worsen     Follow up with your primary care provider or clinic in about 2-3 days  if your symptoms do not improve

## 2024-03-16 NOTE — PROGRESS NOTES
ASSESSMENT/PLAN:      ICD-10-CM    1. Peritonsillar cellulitis  J36 fluticasone (FLONASE) 50 MCG/ACT nasal spray     Streptococcus A Rapid Screen w/Reflex to PCR - Clinic Collect     Group A Streptococcus PCR Throat Swab      2. Non-recurrent acute serous otitis media of both ears  H65.03 amoxicillin-clavulanate (AUGMENTIN) 875-125 MG tablet          Patient Instructions     Start Augmentin 2 times a day for 10 days always take with food to prevent nausea vomiting and will treat strep if the strep test comes back positive       Start Flonase1 spray in each nostril in am and bedtime or congestion/sinus congestion-will also help with fluid behind the ear drums  for 14 days      Start Mucinex -1200 mg daily -the generic is fine -for congestion/sinus congestion-will also help with fluid behind the ear drums for 14 days for 14 days       For pain    Start  Ibuprofen (motrin/advil)  600 mg 3 times a day always take with food for the next 2 to 3 days until pain resolves-maximum dose of ibuprofen is 2400 mg in 24 hours     Can alternate with     Acetaminophen (Tylenol ) 1000 mg 3 times a day for the next 5 to 7 days until pain resolves-maximum dose of acetaminophen (tylenol)  is 3000 mg in 24-hours     Return to clinic or to ER if symptoms worsen     Follow up with your primary care provider or clinic in about 2-3 days  if your symptoms do not improve                Reviewed medication instructions and side effects. Follow up if experiences side effects.     I reviewed supportive care, otc meds to use if needed, expected course, and signs of concern.  Follow up as needed or if she does not improve within  1-2 days or if worsens in any way.  Reviewed red flag symptoms and is to go to the ER if experiences any of these.     The use of Dragon/[employer] services may have been used to construct the content in this note; any grammatical or spelling errors are non-intentional. Please contact the author of this note  directly if you are in need of any clarification.      On the day of the encounter, time spend on chart review, patient visit, review of testing, documentation was 30 minutes              Patient presents with:  Dental Pain: Right side lower teeth pain x 1 day        Subjective     Deanna Hernandez is a 34 year old female who presents to clinic today for the following health issues:    HPI     Acute Illness  Acute illness concerns: 2 day history of pain in the right lower jaw/-patient is pointing inside her mouth to the right side of the back of her throat, painful to chew-pain radiates into the back of her throat when she chews,  painful to swallow on the right side of her throat    Symptoms:  Fever: No  Chills/Sweats: YES  Headache (location?): YES mild  Sinus Pressure: No  Conjunctivitis:  No  Ear Pain: no-complains of ear fullness and ears popping  Rhinorrhea: YES  Congestion: YES-mild  Sore Throat: YES-pain on the right greater than left,   Denies tooth pain wisdom teeth have been removed nontender in the far posterior gingiva or mucosa at the angle of the jaw  Cough: no  Wheeze: No  Decreased Appetite: YES-due to painful to swallow  Nausea: No  Vomiting: No  Diarrhea: No  Fatigue/Achiness: No  Sick/Strep Exposure: Unknown  Therapies tried and outcome: None    Past Medical History:   Diagnosis Date    Normal spontaneous vaginal delivery 11/4/2019     Social History     Tobacco Use    Smoking status: Every Day     Types: Vaping Device    Smokeless tobacco: Never   Substance Use Topics    Alcohol use: Not Currently       Current Outpatient Medications   Medication Sig Dispense Refill    acetaminophen (TYLENOL) 500 MG tablet Take 2 tablets (1,000 mg) by mouth daily as needed for mild pain 100 tablet 0    amoxicillin-clavulanate (AUGMENTIN) 875-125 MG tablet Take 1 tablet by mouth 2 times daily for 10 days 20 tablet 0    fluticasone (FLONASE) 50 MCG/ACT nasal spray Spray 1 spray into both nostrils 2 times daily 16  g 0    hydrOXYzine (ATARAX) 25 MG tablet Take 1-2 tablets (25-50 mg) by mouth 3 times daily as needed for anxiety 30 tablet 3    ibuprofen (ADVIL/MOTRIN) 600 MG tablet Take 600 mg by mouth every 6 hours as needed      metoclopramide (REGLAN) 10 MG tablet Take 1 tab daily as needed for migraine (take with Tylenol) 30 tablet 0    naproxen (NAPROSYN) 500 MG tablet       sertraline (ZOLOFT) 50 MG tablet Take 1 tablet (50 mg) by mouth daily 60 tablet 1     Allergies   Allergen Reactions    Bee Anaphylaxis and Swelling     Swelling of arm as a child               ROS are negative, except as otherwise noted HPI      Objective    /87   Pulse 96   Temp 98.4  F (36.9  C)   Resp 16   Wt 93 kg (205 lb)   LMP 03/01/2024 (Approximate)   SpO2 97%   BMI 35.19 kg/m    Body mass index is 35.19 kg/m .  Physical Exam     GENERAL: Alert and oriented x 3.  Mild distress   HEENT: Diffuse pharyngeal erythema. Tonsils erythematous, enlarged, exudate right greater than left, uvula is midline.  Sclera, lids and conjunctiva are normal.  Nose gesturing and and ears TMs dull air-fluid levels bilateral, ear canals clear bilateral  NECK: Shotty anterior and posterior chain bilateral adenopathy.-Mild tender adenopathy on the right, no asymmetry  CHEST:  clear, no wheezing or rales. Normal symmetric air entry throughout both lung fields.   HEART:  S1 and S2 normal, no murmurs, clicks, gallops or rubs. Regular rate and rhythm.  NEURO:Alert and oriented x3, normal strength and tone, normal gait        Diagnostic Test Results:  Labs reviewed in Epic  Results for orders placed or performed in visit on 03/16/24   Streptococcus A Rapid Screen w/Reflex to PCR - Clinic Collect     Status: Normal    Specimen: Throat; Swab   Result Value Ref Range    Group A Strep antigen Negative Negative   Group A Streptococcus PCR Throat Swab     Status: Normal    Specimen: Throat; Swab   Result Value Ref Range    Group A strep by PCR Not Detected Not Detected     Narrative    The Xpert Xpress Strep A test, performed on the [employer], is a rapid, qualitative in vitro diagnostic test for the detection of Streptococcus pyogenes (Group A ß-hemolytic Streptococcus, Strep A) in throat swab specimens from patients with signs and symptoms of pharyngitis. The Xpert Xpress Strep A test can be used as an aid in the diagnosis of Group A Streptococcal pharyngitis. The assay is not intended to monitor treatment for Group A Streptococcus infections. The Xpert Xpress Strep A test utilizes an automated real-time polymerase chain reaction (PCR) to detect Streptococcus pyogenes DNA.

## 2024-04-12 ENCOUNTER — E-VISIT (OUTPATIENT)
Dept: FAMILY MEDICINE | Facility: CLINIC | Age: 34
End: 2024-04-12
Payer: COMMERCIAL

## 2024-04-12 DIAGNOSIS — F32.A DEPRESSION, UNSPECIFIED DEPRESSION TYPE: Primary | ICD-10-CM

## 2024-04-12 PROCEDURE — 99207 PR NON-BILLABLE SERV PER CHARTING: CPT | Performed by: FAMILY MEDICINE

## 2024-04-12 ASSESSMENT — PATIENT HEALTH QUESTIONNAIRE - PHQ9
SUM OF ALL RESPONSES TO PHQ QUESTIONS 1-9: 16
10. IF YOU CHECKED OFF ANY PROBLEMS, HOW DIFFICULT HAVE THESE PROBLEMS MADE IT FOR YOU TO DO YOUR WORK, TAKE CARE OF THINGS AT HOME, OR GET ALONG WITH OTHER PEOPLE: VERY DIFFICULT
SUM OF ALL RESPONSES TO PHQ QUESTIONS 1-9: 16

## 2024-04-12 ASSESSMENT — ANXIETY QUESTIONNAIRES
3. WORRYING TOO MUCH ABOUT DIFFERENT THINGS: MORE THAN HALF THE DAYS
2. NOT BEING ABLE TO STOP OR CONTROL WORRYING: NEARLY EVERY DAY
4. TROUBLE RELAXING: SEVERAL DAYS
7. FEELING AFRAID AS IF SOMETHING AWFUL MIGHT HAPPEN: NEARLY EVERY DAY
7. FEELING AFRAID AS IF SOMETHING AWFUL MIGHT HAPPEN: NEARLY EVERY DAY
8. IF YOU CHECKED OFF ANY PROBLEMS, HOW DIFFICULT HAVE THESE MADE IT FOR YOU TO DO YOUR WORK, TAKE CARE OF THINGS AT HOME, OR GET ALONG WITH OTHER PEOPLE?: VERY DIFFICULT
GAD7 TOTAL SCORE: 17
6. BECOMING EASILY ANNOYED OR IRRITABLE: NEARLY EVERY DAY
5. BEING SO RESTLESS THAT IT IS HARD TO SIT STILL: MORE THAN HALF THE DAYS
GAD7 TOTAL SCORE: 17
GAD7 TOTAL SCORE: 17
1. FEELING NERVOUS, ANXIOUS, OR ON EDGE: NEARLY EVERY DAY

## 2024-04-12 NOTE — PATIENT INSTRUCTIONS
Thank you for choosing us for your care. I think an in-clinic visit would be best next steps based on your symptoms. Please schedule a clinic appointment; you won t be charged for this eVisit.      You can schedule an appointment right here in [facility], or call [phone]      Deanna,  Elias treatment for depression or anxiety requires a back-and-forth discussion of symptoms in detail and options for treatment.  This can be virtual (video visit), but should be an appointment where we can talk about this further.  Let me know if you have trouble scheduling in a timely manner.    KHUSHI Jones MD

## 2024-04-12 NOTE — TELEPHONE ENCOUNTER
Provider E-Visit time total (minutes): 3    For new depression, recommended visit for better discussion of symptoms and options (can be video).

## 2024-04-13 ASSESSMENT — PATIENT HEALTH QUESTIONNAIRE - PHQ9: SUM OF ALL RESPONSES TO PHQ QUESTIONS 1-9: 16

## 2024-04-13 ASSESSMENT — ANXIETY QUESTIONNAIRES: GAD7 TOTAL SCORE: 17

## 2024-08-16 ENCOUNTER — TELEPHONE (OUTPATIENT)
Dept: OPHTHALMOLOGY | Facility: CLINIC | Age: 34
End: 2024-08-16
Payer: COMMERCIAL

## 2024-09-26 ENCOUNTER — OFFICE VISIT (OUTPATIENT)
Dept: FAMILY MEDICINE | Facility: CLINIC | Age: 34
End: 2024-09-26
Payer: COMMERCIAL

## 2024-09-26 VITALS
HEIGHT: 65 IN | WEIGHT: 220.5 LBS | TEMPERATURE: 98.3 F | HEART RATE: 118 BPM | DIASTOLIC BLOOD PRESSURE: 101 MMHG | SYSTOLIC BLOOD PRESSURE: 149 MMHG | BODY MASS INDEX: 36.74 KG/M2 | OXYGEN SATURATION: 98 % | RESPIRATION RATE: 16 BRPM

## 2024-09-26 DIAGNOSIS — F17.200 NICOTINE DEPENDENCE, UNCOMPLICATED, UNSPECIFIED NICOTINE PRODUCT TYPE: ICD-10-CM

## 2024-09-26 DIAGNOSIS — Z13.6 SCREENING FOR HEART DISEASE: ICD-10-CM

## 2024-09-26 DIAGNOSIS — Z00.00 ENCOUNTER FOR ANNUAL PHYSICAL EXAM: Primary | ICD-10-CM

## 2024-09-26 DIAGNOSIS — Z11.59 NEED FOR HEPATITIS C SCREENING TEST: ICD-10-CM

## 2024-09-26 DIAGNOSIS — Z23 ENCOUNTER FOR IMMUNIZATION: ICD-10-CM

## 2024-09-26 DIAGNOSIS — I10 BENIGN ESSENTIAL HYPERTENSION: ICD-10-CM

## 2024-09-26 DIAGNOSIS — Z30.013 ENCOUNTER FOR INITIAL PRESCRIPTION OF INJECTABLE CONTRACEPTIVE: ICD-10-CM

## 2024-09-26 LAB — HCG UR QL: NEGATIVE

## 2024-09-26 PROCEDURE — 99214 OFFICE O/P EST MOD 30 MIN: CPT | Mod: 25

## 2024-09-26 PROCEDURE — 96372 THER/PROPH/DIAG INJ SC/IM: CPT

## 2024-09-26 PROCEDURE — 91320 SARSCV2 VAC 30MCG TRS-SUC IM: CPT

## 2024-09-26 PROCEDURE — 81025 URINE PREGNANCY TEST: CPT

## 2024-09-26 PROCEDURE — 90480 ADMN SARSCOV2 VAC 1/ONLY CMP: CPT

## 2024-09-26 PROCEDURE — 90471 IMMUNIZATION ADMIN: CPT

## 2024-09-26 PROCEDURE — 90472 IMMUNIZATION ADMIN EACH ADD: CPT

## 2024-09-26 PROCEDURE — 99395 PREV VISIT EST AGE 18-39: CPT | Mod: 25

## 2024-09-26 PROCEDURE — 90677 PCV20 VACCINE IM: CPT

## 2024-09-26 PROCEDURE — 90656 IIV3 VACC NO PRSV 0.5 ML IM: CPT

## 2024-09-26 RX ORDER — VARENICLINE TARTRATE 1 MG/1
1 TABLET, FILM COATED ORAL 2 TIMES DAILY
Qty: 180 TABLET | Refills: 0 | Status: SHIPPED | OUTPATIENT
Start: 2024-10-26

## 2024-09-26 RX ORDER — VARENICLINE TARTRATE 0.5 MG/1
TABLET, FILM COATED ORAL
Qty: 95 TABLET | Refills: 0 | Status: SHIPPED | OUTPATIENT
Start: 2024-09-26 | End: 2024-10-01

## 2024-09-26 RX ORDER — MEDROXYPROGESTERONE ACETATE 150 MG/ML
150 INJECTION, SUSPENSION INTRAMUSCULAR
Status: ACTIVE | OUTPATIENT
Start: 2024-09-26 | End: 2025-09-21

## 2024-09-26 RX ADMIN — MEDROXYPROGESTERONE ACETATE 150 MG: 150 INJECTION, SUSPENSION INTRAMUSCULAR at 12:51

## 2024-09-26 ASSESSMENT — PAIN SCALES - GENERAL: PAINLEVEL: NO PAIN (0)

## 2024-09-26 ASSESSMENT — PATIENT HEALTH QUESTIONNAIRE - PHQ9
SUM OF ALL RESPONSES TO PHQ QUESTIONS 1-9: 8
10. IF YOU CHECKED OFF ANY PROBLEMS, HOW DIFFICULT HAVE THESE PROBLEMS MADE IT FOR YOU TO DO YOUR WORK, TAKE CARE OF THINGS AT HOME, OR GET ALONG WITH OTHER PEOPLE: SOMEWHAT DIFFICULT
SUM OF ALL RESPONSES TO PHQ QUESTIONS 1-9: 8

## 2024-09-26 NOTE — PROGRESS NOTES
Clinic Administered Medication Documentation        Patient was given Depo Provera. Prior to medication administration, verified patient's identity using patient s name and date of birth. Please see MAR and medication order for additional information. Patient instructed to remain in clinic for 15 minutes and report any adverse reaction to staff immediately.    Vial/Syringe: Single dose vial. Was entire vial of medication used? Yes    HCG test was negative     NEXT INJECTION DUE: 12/12/24 - 1/9/25    Answers submitted by the patient for this visit:  Patient Health Questionnaire (Submitted on 9/26/2024)  If you checked off any problems, how difficult have these problems made it for you to do your work, take care of things at home, or get along with other people?: Somewhat difficult  PHQ9 TOTAL SCORE: 8

## 2024-09-26 NOTE — PROGRESS NOTES
Preventive Care Visit  [facility] INTEGRATED PRIMARY CARE  James Diane NP, Nurse Practitioner Primary Care  Sep 26, 2024      Assessment & Plan     Need for hepatitis C screening test  - Hepatitis C Screen Reflex to HCV RNA Quant and Genotype; Future    Nicotine dependence, uncomplicated, unspecified nicotine product type  - varenicline (CHANTIX) 0.5 MG tablet; Take 0.5 mg (1 tablet) once a day for 3 days, THEN 0.5 mg (1 tablet) twice daily for 4 days, THEN 1.0 mg (2 tablets) twice daily  - varenicline (CHANTIX CONTINUING MONTH PAK) 1 MG tablet; Take 1 tablet (1 mg) by mouth 2 times daily. Continue therapy for 3 months.    Encounter for immunization  - Pneumococcal 20 Valent Conjugate (Prevnar 20)  - INFLUENZA VACCINE,SPLIT VIRUS,TRIVALENT,PF(FLUZONE)  - COVID-19 12+ (PFIZER)    Screening for heart disease  - Lipid panel reflex to direct LDL Fasting; Future    Benign essential hypertension  - Basic metabolic panel  (Ca, Cl, CO2, Creat, Gluc, K, Na, BUN); Future  BP have been borderline: 130s/90s on the high end. Discussed importance of nicotine cessation in controlling blood pressure. Discussed salt, caffeine reduction, exercise routines with patient today    Encounter for initial prescription of injectable contraceptive  - HCG Qual, Urine ([id number]); Future  - medroxyPROGESTERone (DEPO-PROVERA) injection 150 mg  - HCG Qual, Urine ([id number])  .  Patient is interested in restarting contraceptives.  Given the presence of some blood pressure issues, we will do Depo shot for her.   is planning on getting a vasectomy.      Annual physical  Today, we discussed nicotine cessation as a primary goal for patient.    Patient has been advised of split billing requirements and indicates understanding: Yes        Nicotine/Tobacco Cessation  She reports that she has been smoking vaping device. She has never used smokeless tobacco.  Nicotine/Tobacco Cessation Plan  Pharmacotherapies : varenicline  (Chantix)        Subjective   Deanna is a 34 year old, presenting for the following:  Physical    Occupation: does  for company that competes with [employer]. Enjoys her job.  Family: SO-Jerome. 4 children- 10, 6, 4, 2 - (2 children are autistic).    Diet: Chicken nuggets, Mac N Cheese, Spagetti.  Caffeine: Does a travel mug (maybe 16 oz?) of coffee and sometimes 1-2 cans of soda  Exercise: None  Sleep: Youngest two haven't been sleeping. Some nights, she gets 3 hours of sleep. Generally 5 hours.    BC: Interested in restarting.    Pap: Due 06/2027 9/26/2024    11:21 AM   Additional Questions   Roomed by Chuck   Accompanied by Self        HPI        9/26/2024   General Health   How would you rate your overall physical health? (!) FAIR   Feel stress (tense, anxious, or unable to sleep) Very much      (!) STRESS CONCERN- Feeling stressed out lately. Children, not sleeping.      9/26/2024   Nutrition   Three or more servings of calcium each day? Yes   Diet: Regular (no restrictions)   How many servings of fruit and vegetables per day? (!) 0-1   How many sweetened beverages each day? (!) 3            9/26/2024   Exercise   Days per week of moderate/strenous exercise 0 days   Average minutes spent exercising at this level 0 min      (!) EXERCISE CONCERN      9/26/2024   Social Factors   Frequency of gathering with friends or relatives Twice a week   Worry food won't last until get money to buy more No   Food not last or not have enough money for food? No   Do you have housing? (Housing is defined as stable permanent housing and does not include staying ouside in a car, in a tent, in an abandoned building, in an overnight shelter, or couch-surfing.) Yes   Are you worried about losing your housing? No   Lack of transportation? No   Unable to get utilities (heat,electricity)? No   Want help with housing or utility concern? No            9/26/2024   Dental   Dentist two times every year? (!) NO- Does not  "know how long it has been.   Eye- Has been a while.        9/26/2024   TB Screening   Were you born outside of the [place]? No          Today's PHQ-9 Score:       9/26/2024    11:13 AM   PHQ-9 SCORE   PHQ-9 Total Score MyChart 8 (Mild depression)   PHQ-9 Total Score 8         9/26/2024   Substance Use   Alcohol more than 3/day or more than 7/wk Not Applicable   Do you use any other substances recreationally? No        Social History     Tobacco Use    Smoking status: Every Day     Types: Vaping Device    Smokeless tobacco: Never   Vaping Use    Vaping status: Every Day    Substances: Nicotine    Devices: Disposable, Pre-filled or refillable cartridge   Substance Use Topics    Alcohol use: Not Currently    Drug use: Not Currently           2/12/2024   LAST FHS-7 RESULTS   1st degree relative breast or ovarian cancer No   Any relative bilateral breast cancer No   Any male have breast cancer No   Any ONE woman have BOTH breast AND ovarian cancer No   Any woman with breast cancer before 50yrs No   2 or more relatives with breast AND/OR ovarian cancer No   2 or more relatives with breast AND/OR bowel cancer No           Mammogram Screening - Patient under 40 years of age: Routine Mammogram Screening not recommended.         9/26/2024   STI Screening   New sexual partner(s) since last STI/HIV test? No        History of abnormal Pap smear: No - age 30-64 HPV with reflex Pap every 5 years recommended        6/27/2022     1:37 PM   PAP / HPV   PAP-ABSTRACT See Scanned Document           This result is from an external source.           9/26/2024   Contraception/Family Planning   Questions about contraception or family planning (!) YES           Reviewed and updated as needed this visit by Provider                         Objective    Exam  LMP 08/16/2024 (Approximate)    Estimated body mass index is 35.19 kg/m  as calculated from the following:    Height as of 3/7/24: 1.626 m (5' 4\").    Weight as of 3/16/24: 93 kg (205 " lb).    Physical Exam  GENERAL: alert and no distress  EYES: Eyes grossly normal to inspection, PERRL and conjunctivae and sclerae normal  HENT: ear canals and TM's normal, nose and mouth without ulcers or lesions  NECK: no adenopathy, no asymmetry, masses, or scars  RESP: lungs clear to auscultation - no rales, rhonchi or wheezes  CV: regular rate and rhythm, normal S1 S2, no S3 or S4, no murmur, click or rub, no peripheral edema  ABDOMEN: soft, nontender, no hepatosplenomegaly, no masses and bowel sounds normal  MS: no gross musculoskeletal defects noted, no edema  SKIN: no suspicious lesions or rashes  NEURO: Normal strength and tone, mentation intact and speech normal  PSYCH: mentation appears normal, affect normal/bright        Signed Electronically by: James Diane NP    Answers submitted by the patient for this visit:  Patient Health Questionnaire (Submitted on 9/26/2024)  If you checked off any problems, how difficult have these problems made it for you to do your work, take care of things at home, or get along with other people?: Somewhat difficult  PHQ9 TOTAL SCORE: 8

## 2024-09-27 ENCOUNTER — MYC MEDICAL ADVICE (OUTPATIENT)
Dept: FAMILY MEDICINE | Facility: CLINIC | Age: 34
End: 2024-09-27
Payer: COMMERCIAL

## 2024-09-27 DIAGNOSIS — F17.200 NICOTINE DEPENDENCE, UNCOMPLICATED, UNSPECIFIED NICOTINE PRODUCT TYPE: ICD-10-CM

## 2024-09-28 ENCOUNTER — HOSPITAL ENCOUNTER (EMERGENCY)
Facility: CLINIC | Age: 34
Discharge: HOME | End: 2024-09-29
Attending: EMERGENCY MEDICINE | Admitting: EMERGENCY MEDICINE
Payer: COMMERCIAL

## 2024-09-28 DIAGNOSIS — G43.009 MIGRAINE WITHOUT AURA AND WITHOUT STATUS MIGRAINOSUS, NOT INTRACTABLE: ICD-10-CM

## 2024-09-28 PROCEDURE — 96375 TX/PRO/DX INJ NEW DRUG ADDON: CPT | Performed by: EMERGENCY MEDICINE

## 2024-09-28 PROCEDURE — 96374 THER/PROPH/DIAG INJ IV PUSH: CPT | Performed by: EMERGENCY MEDICINE

## 2024-09-28 PROCEDURE — 258N000003 HC RX IP 258 OP 636: Performed by: EMERGENCY MEDICINE

## 2024-09-28 PROCEDURE — 99284 EMERGENCY DEPT VISIT MOD MDM: CPT | Mod: 25 | Performed by: EMERGENCY MEDICINE

## 2024-09-28 PROCEDURE — 96361 HYDRATE IV INFUSION ADD-ON: CPT | Performed by: EMERGENCY MEDICINE

## 2024-09-28 PROCEDURE — 250N000013 HC RX MED GY IP 250 OP 250 PS 637: Performed by: EMERGENCY MEDICINE

## 2024-09-28 PROCEDURE — 99284 EMERGENCY DEPT VISIT MOD MDM: CPT | Performed by: EMERGENCY MEDICINE

## 2024-09-28 PROCEDURE — 250N000011 HC RX IP 250 OP 636: Performed by: EMERGENCY MEDICINE

## 2024-09-28 RX ORDER — ACETAMINOPHEN 500 MG
1000 TABLET ORAL ONCE
Status: COMPLETED | OUTPATIENT
Start: 2024-09-28 | End: 2024-09-28

## 2024-09-28 RX ORDER — DIPHENHYDRAMINE HYDROCHLORIDE 50 MG/ML
50 INJECTION INTRAMUSCULAR; INTRAVENOUS ONCE
Status: COMPLETED | OUTPATIENT
Start: 2024-09-28 | End: 2024-09-28

## 2024-09-28 RX ORDER — KETOROLAC TROMETHAMINE 15 MG/ML
15 INJECTION, SOLUTION INTRAMUSCULAR; INTRAVENOUS ONCE
Status: COMPLETED | OUTPATIENT
Start: 2024-09-28 | End: 2024-09-28

## 2024-09-28 RX ADMIN — PROCHLORPERAZINE EDISYLATE 10 MG: 5 INJECTION INTRAMUSCULAR; INTRAVENOUS at 22:08

## 2024-09-28 RX ADMIN — KETOROLAC TROMETHAMINE 15 MG: 15 INJECTION, SOLUTION INTRAMUSCULAR; INTRAVENOUS at 22:07

## 2024-09-28 RX ADMIN — DIPHENHYDRAMINE HYDROCHLORIDE 50 MG: 50 INJECTION, SOLUTION INTRAMUSCULAR; INTRAVENOUS at 22:07

## 2024-09-28 RX ADMIN — SODIUM CHLORIDE 1000 ML: 9 INJECTION, SOLUTION INTRAVENOUS at 22:07

## 2024-09-28 RX ADMIN — ACETAMINOPHEN 1000 MG: 500 TABLET ORAL at 22:07

## 2024-09-28 ASSESSMENT — COLUMBIA-SUICIDE SEVERITY RATING SCALE - C-SSRS
1. IN THE PAST MONTH, HAVE YOU WISHED YOU WERE DEAD OR WISHED YOU COULD GO TO SLEEP AND NOT WAKE UP?: NO
6. HAVE YOU EVER DONE ANYTHING, STARTED TO DO ANYTHING, OR PREPARED TO DO ANYTHING TO END YOUR LIFE?: NO
2. HAVE YOU ACTUALLY HAD ANY THOUGHTS OF KILLING YOURSELF IN THE PAST MONTH?: NO

## 2024-09-28 ASSESSMENT — ACTIVITIES OF DAILY LIVING (ADL)
ADLS_ACUITY_SCORE: 33
ADLS_ACUITY_SCORE: 35

## 2024-09-29 VITALS
DIASTOLIC BLOOD PRESSURE: 92 MMHG | OXYGEN SATURATION: 94 % | TEMPERATURE: 98 F | RESPIRATION RATE: 16 BRPM | SYSTOLIC BLOOD PRESSURE: 159 MMHG | HEART RATE: 91 BPM

## 2024-09-29 NOTE — DISCHARGE INSTRUCTIONS
Drink plenty fluids, get rest.  Return to the emergency department if you have new or worsening symptoms or concerns.  You can discuss with your primary care doctor or neurologist abortive medications for migraines as they can help before they become severe.

## 2024-09-29 NOTE — ED PROVIDER NOTES
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 The patient states that he woke up and had a mild headache which slowly progressed to a migraine. She reports this is how her normal margines develop. Her pain goes from her neck to her whole head. She did have a disc fusion to help with her migraines. She has sensitivity to sound and light. She has been nauseous. She took 500mg of Tylenol and Ibuprofen around 330 today. She denies any fevers, trauma to head, change in vision, weakness, numbness or facial dropping.     Past Medical History  Past Medical History:   Diagnosis Date    Normal spontaneous vaginal delivery 11/4/2019     Past Surgical History:   Procedure Laterality Date    BIOPSY/REMOVAL, LYMPH NODE(S)      left neck    CERVICAL FUSION      CHOLECYSTECTOMY       acetaminophen (TYLENOL) 500 MG tablet  fluticasone (FLONASE) 50 MCG/ACT nasal spray  hydrOXYzine (ATARAX) 25 MG tablet  ibuprofen (ADVIL/MOTRIN) 600 MG tablet  metoclopramide (REGLAN) 10 MG tablet  naproxen (NAPROSYN) 500 MG tablet  sertraline (ZOLOFT) 50 MG tablet  [START ON 10/26/2024] varenicline (CHANTIX CONTINUING MONTH PAK) 1 MG tablet  varenicline (CHANTIX) 0.5 MG tablet      Allergies   Allergen Reactions    Bee Anaphylaxis and Swelling     Swelling of arm as a child       Family History  Family History   Problem Relation Age of Onset    Hypertension Mother     Mental Illness Mother         Borderline    Alcoholism Mother     No Known Problems Father     Cancer Maternal Grandfather         lung    Breast Cancer No family hx of     Colorectal Cancer No family hx of     Coronary Artery Disease No family hx of     Coronary Artery Disease  Early Onset No family hx of     Diabetes No family hx of      Social History   Social History     Tobacco Use    Smoking status: Every Day     Types: Vaping Device    Smokeless tobacco: Never   Vaping Use    Vaping status: Every Day    Substances: Nicotine    Devices: Disposable, Pre-filled or refillable cartridge   Substance Use Topics    Alcohol use: Not Currently    Drug use: Not Currently      A medically appropriate review of systems was performed with pertinent positives and negatives noted in the HPI, and all other systems negative.    Physical Exam   BP: (!) 155/120  Pulse: 69  Temp: 98.7  F (37.1  C)  Resp: 16  SpO2: 100 %  Physical Exam  General:  No acute distress.  HENT:  Normocephalic and atraumatic. No meningismus  Eyes: EOMI. Conjunctivae normal. Wearing sunglasses  Cardiovascular: Normal rate and regular rhythm.  Normal heart sounds. No murmur heard.  Pulmonary:  No respiratory distress. Normal breath sounds.   Abdominal: There is no distension.  Abdomen is soft. There is no mass.  There is no abdominal tenderness.   Musculoskeletal: No swelling or tenderness.  Moving all extremities spontaneously.    Skin: Warm and dry  Neurological: No focal deficit present. CN 2-12 intact  Mood and Affect: Mood normal.     ED Course, Procedures, & Data      Procedures                No results found for any visits on 09/28/24.  Medications - No data to display  Labs Ordered and Resulted from Time of ED Arrival to Time of ED Departure - No data to display  No orders to display          Critical care was not performed.     Medical Decision Making  The patient's presentation was of moderate complexity (a chronic illness mild to moderate exacerbation, progression, or side effect of treatment).  The patient's evaluation involved:  review of external note(s) from 3+ sources (see separate area of note for details)  review of 3+ test result(s) ordered prior to this encounter (see separate area of note for details)  The  patient's management necessitated only low risk treatment.    Assessment & Plan    Patient presents with spouse for migraine without aura, not improved with supportive care at home.  Patient states this is the exact same as her typical migraines.  No new presenting symptoms.  No signs of infection, meningismus, acute focal neurologic deficits.  Neuroexam is reassuring.      Patient was given migraine cocktail.  On reevaluation she feels much better and wants to go home.  She is able to walk and tolerate p.o. without difficulty.  She was discharged with her .    I have reviewed the nursing notes. I have reviewed the findings, diagnosis, plan and need for follow up with the patient.    New Prescriptions    No medications on file       Final diagnoses:   None   I, Belinda Whitmore, am serving as a trained medical scribe to document services personally performed by Nora Mcfadden DO, based on the provider's statements to me.     Nora ANDINO DO, was physically present and have reviewed and verified the accuracy of this note documented by Belinda Whitmore.     Nora Mcfadden DO  [facility] EMERGENCY DEPARTMENT  9/28/2024     Nora Mcfadden DO  09/29/24 0246

## 2024-09-29 NOTE — ED NOTES
Pt denies pain. Discharged to home. Advised to follow up with PCP and to return to ED if symptoms worsen.

## 2024-09-29 NOTE — ED TRIAGE NOTES
Headache unrelieved by tylenol and advil. History of migraines a few years ago.     Also reporting nausea

## 2024-10-01 RX ORDER — VARENICLINE TARTRATE 0.5 MG/1
TABLET, FILM COATED ORAL
Qty: 95 TABLET | Refills: 0 | Status: SHIPPED | OUTPATIENT
Start: 2024-10-01

## 2024-10-01 NOTE — TELEPHONE ENCOUNTER
Please see my chart message. Order has been resent to the pharmacy. Thanks    Magaly Washington RN  [facility]

## 2024-10-13 ENCOUNTER — HOSPITAL ENCOUNTER (EMERGENCY)
Facility: CLINIC | Age: 34
Discharge: HOME | End: 2024-10-13
Attending: EMERGENCY MEDICINE | Admitting: EMERGENCY MEDICINE
Payer: COMMERCIAL

## 2024-10-13 VITALS
HEIGHT: 64 IN | OXYGEN SATURATION: 98 % | RESPIRATION RATE: 16 BRPM | TEMPERATURE: 99.5 F | SYSTOLIC BLOOD PRESSURE: 151 MMHG | BODY MASS INDEX: 37.66 KG/M2 | WEIGHT: 220.6 LBS | DIASTOLIC BLOOD PRESSURE: 98 MMHG | HEART RATE: 112 BPM

## 2024-10-13 DIAGNOSIS — J02.0 STREP PHARYNGITIS: ICD-10-CM

## 2024-10-13 LAB
FLUAV RNA SPEC QL NAA+PROBE: NEGATIVE
FLUBV RNA RESP QL NAA+PROBE: NEGATIVE
GROUP A STREP BY PCR: DETECTED
RSV RNA SPEC NAA+PROBE: NEGATIVE
SARS-COV-2 RNA RESP QL NAA+PROBE: NEGATIVE

## 2024-10-13 PROCEDURE — 250N000012 HC RX MED GY IP 250 OP 636 PS 637: Performed by: EMERGENCY MEDICINE

## 2024-10-13 PROCEDURE — 87637 SARSCOV2&INF A&B&RSV AMP PRB: CPT | Performed by: EMERGENCY MEDICINE

## 2024-10-13 PROCEDURE — 87651 STREP A DNA AMP PROBE: CPT | Performed by: EMERGENCY MEDICINE

## 2024-10-13 PROCEDURE — 250N000013 HC RX MED GY IP 250 OP 250 PS 637: Performed by: EMERGENCY MEDICINE

## 2024-10-13 PROCEDURE — 99283 EMERGENCY DEPT VISIT LOW MDM: CPT | Performed by: EMERGENCY MEDICINE

## 2024-10-13 RX ORDER — DEXAMETHASONE 2 MG/1
4 TABLET ORAL ONCE
Status: COMPLETED | OUTPATIENT
Start: 2024-10-13 | End: 2024-10-13

## 2024-10-13 RX ORDER — AMOXICILLIN 250 MG/1
750 CAPSULE ORAL 2 TIMES DAILY
Qty: 60 CAPSULE | Refills: 0 | Status: SHIPPED | OUTPATIENT
Start: 2024-10-13 | End: 2024-10-23

## 2024-10-13 RX ORDER — ACETAMINOPHEN 500 MG
1000 TABLET ORAL ONCE
Status: COMPLETED | OUTPATIENT
Start: 2024-10-13 | End: 2024-10-13

## 2024-10-13 RX ORDER — AMOXICILLIN 250 MG/1
750 CAPSULE ORAL ONCE
Status: COMPLETED | OUTPATIENT
Start: 2024-10-13 | End: 2024-10-13

## 2024-10-13 RX ADMIN — ACETAMINOPHEN 1000 MG: 500 TABLET ORAL at 23:51

## 2024-10-13 RX ADMIN — AMOXICILLIN 750 MG: 250 CAPSULE ORAL at 23:51

## 2024-10-13 RX ADMIN — DEXAMETHASONE 4 MG: 2 TABLET ORAL at 23:50

## 2024-10-13 ASSESSMENT — COLUMBIA-SUICIDE SEVERITY RATING SCALE - C-SSRS
6. HAVE YOU EVER DONE ANYTHING, STARTED TO DO ANYTHING, OR PREPARED TO DO ANYTHING TO END YOUR LIFE?: NO
1. IN THE PAST MONTH, HAVE YOU WISHED YOU WERE DEAD OR WISHED YOU COULD GO TO SLEEP AND NOT WAKE UP?: NO
2. HAVE YOU ACTUALLY HAD ANY THOUGHTS OF KILLING YOURSELF IN THE PAST MONTH?: NO

## 2024-10-13 ASSESSMENT — ACTIVITIES OF DAILY LIVING (ADL): ADLS_ACUITY_SCORE: 35

## 2024-10-14 NOTE — ED TRIAGE NOTES
Triage Assessment (Adult)       Row Name 10/13/24 [id number]          Triage Assessment    Airway WDL WDL        Respiratory WDL    Respiratory WDL WDL        Skin Circulation/Temperature WDL    Skin Circulation/Temperature WDL WDL

## 2024-10-14 NOTE — ED PROVIDER NOTES
[facility] [place])    10/13/24      ED PROVIDER NOTE    History     Chief Complaint   Patient presents with    Pharyngitis     Sore throat since Friday morning.  Getting worse. Red and tender.     HPI  Deanna Hernandez is a 34 year old female with history of Castleman's disease who presents to the ED for evaluation of sore throat. Patient reports sore throat began 2 days ago. She is also having joint pain and began having diarrhea this morning.Denies any cough, runny nose, difficulty breathing. Denies any known sick contacts. She has not done COVID testing at home. Denies any discomfort with urination. No other symptoms noted.      Past Medical History  Past Medical History:   Diagnosis Date    Normal spontaneous vaginal delivery 11/4/2019     Past Surgical History:   Procedure Laterality Date    BIOPSY/REMOVAL, LYMPH NODE(S)      left neck    CERVICAL FUSION      CHOLECYSTECTOMY       acetaminophen (TYLENOL) 500 MG tablet  fluticasone (FLONASE) 50 MCG/ACT nasal spray  hydrOXYzine (ATARAX) 25 MG tablet  ibuprofen (ADVIL/MOTRIN) 600 MG tablet  metoclopramide (REGLAN) 10 MG tablet  naproxen (NAPROSYN) 500 MG tablet  sertraline (ZOLOFT) 50 MG tablet  [START ON 10/26/2024] varenicline (CHANTIX CONTINUING MONTH PAK) 1 MG tablet  varenicline (CHANTIX) 0.5 MG tablet      Allergies   Allergen Reactions    Bee Anaphylaxis and Swelling     Swelling of arm as a child       Family History  Family History   Problem Relation Age of Onset    Hypertension Mother     Mental Illness Mother         Borderline    Alcoholism Mother     No Known Problems Father     Cancer Maternal Grandfather         lung    Breast Cancer No family hx of     Colorectal Cancer No family hx of     Coronary Artery Disease No family hx of     Coronary Artery Disease Early Onset No family hx of     Diabetes No family hx of      Social History   Social History     Tobacco Use    Smoking status: Every Day     Types: Vaping  "Device    Smokeless tobacco: Never   Vaping Use    Vaping status: Every Day    Substances: Nicotine    Devices: Disposable, Pre-filled or refillable cartridge   Substance Use Topics    Alcohol use: Not Currently    Drug use: Not Currently      Past medical history, past surgical history, medications, allergies, family history, and social history were reviewed with the patient. No additional pertinent items.     A medically appropriate review of systems was performed with pertinent positives and negatives noted in the HPI, and all other systems negative.    Physical Exam   BP: (!) 151/98  Pulse: 112  Temp: 99.5  F (37.5  C)  Resp: 16  Height: 162.6 cm (5' 4\")  Weight: 100.1 kg (220 lb 9.6 oz)  SpO2: 98 %  Physical Exam  Constitutional:       General: She is not in acute distress.     Appearance: Normal appearance. She is well-developed.   HENT:      Head: Normocephalic and atraumatic.   Eyes:      General: No scleral icterus.     Conjunctiva/sclera: Conjunctivae normal.   Cardiovascular:      Rate and Rhythm: Normal rate.   Pulmonary:      Effort: Pulmonary effort is normal. No respiratory distress.   Abdominal:      General: Abdomen is flat.   Musculoskeletal:      Cervical back: Normal range of motion and neck supple.   Skin:     General: Skin is warm and dry.      Findings: No rash.   Neurological:      Mental Status: She is alert and oriented to person, place, and time.         ED Course, Procedures, & Data      Procedures            No results found for any visits on 10/13/24.  Medications - No data to display  Labs Ordered and Resulted from Time of ED Arrival to Time of ED Departure - No data to display  No orders to display          Assessment & Plan      34 year old female with history of Castleman's disease who presents to the ED for evaluation of sore throat.  Vital signs notable for blood pressure elevation 151/98 and pulse elevation 112 and low-grade temperature 99.5.  Rapid strep test positive, COVID and " influenza negative.  Patient given dexamethasone and amoxicillin here in the emergency department and prescriptions for antibiotics with plan to follow-up with primary care provider for further evaluation and care.    I have reviewed the nursing notes. I have reviewed the findings, diagnosis, plan and need for follow up with the patient.    New Prescriptions    No medications on file       Final diagnoses:   Strep pharyngitis   I, Rico Grimm, am serving as a trained medical scribe to document services personally performed by Joelle Chaudhary MD based on the provider's statements to me on October 13, 2024.  This document has been checked and approved by the attending provider.    I, Joelle Chaudhary MD, was physically present and have reviewed and verified the accuracy of this note documented by Rico Grimm medical scribe.      Joelle Chaudhary MD  [facility] EMERGENCY DEPARTMENT  10/13/2024     Joelle Chaudhary MD  10/15/24 0210

## 2024-10-20 ENCOUNTER — E-VISIT (OUTPATIENT)
Dept: FAMILY MEDICINE | Facility: CLINIC | Age: 34
End: 2024-10-20
Payer: COMMERCIAL

## 2024-10-20 DIAGNOSIS — N94.9 VAGINAL DISCOMFORT: Primary | ICD-10-CM

## 2024-10-20 PROCEDURE — 99421 OL DIG E/M SVC 5-10 MIN: CPT

## 2024-10-21 NOTE — PATIENT INSTRUCTIONS
Thank you for choosing us for your care. Given your symptoms, I would like you to do a lab-only visit to determine what is causing them.  I have placed the orders.  Please schedule an appointment with the lab right here in [facility], or call [phone].  I will let you know when the results are back and next steps to take.    Schedule a Lab Only appointment here.

## 2024-10-22 ENCOUNTER — LAB (OUTPATIENT)
Dept: LAB | Facility: CLINIC | Age: 34
End: 2024-10-22
Payer: COMMERCIAL

## 2024-10-22 DIAGNOSIS — N94.9 VAGINAL DISCOMFORT: ICD-10-CM

## 2024-10-22 DIAGNOSIS — I10 BENIGN ESSENTIAL HYPERTENSION: ICD-10-CM

## 2024-10-22 DIAGNOSIS — Z13.6 SCREENING FOR HEART DISEASE: ICD-10-CM

## 2024-10-22 DIAGNOSIS — B37.31 YEAST INFECTION OF THE VAGINA: Primary | ICD-10-CM

## 2024-10-22 DIAGNOSIS — Z11.59 NEED FOR HEPATITIS C SCREENING TEST: ICD-10-CM

## 2024-10-22 LAB
ANION GAP SERPL CALCULATED.3IONS-SCNC: 13 MMOL/L (ref 7–15)
BUN SERPL-MCNC: 5.6 MG/DL (ref 6–20)
CALCIUM SERPL-MCNC: 9.4 MG/DL (ref 8.8–10.4)
CHLORIDE SERPL-SCNC: 106 MMOL/L (ref 98–107)
CHOLEST SERPL-MCNC: 208 MG/DL
CLUE CELLS: PRESENT
CREAT SERPL-MCNC: 0.81 MG/DL (ref 0.51–0.95)
EGFRCR SERPLBLD CKD-EPI 2021: >90 ML/MIN/1.73M2
FASTING STATUS PATIENT QL REPORTED: YES
FASTING STATUS PATIENT QL REPORTED: YES
GLUCOSE SERPL-MCNC: 91 MG/DL (ref 70–99)
HCO3 SERPL-SCNC: 22 MMOL/L (ref 22–29)
HCV AB SERPL QL IA: NONREACTIVE
HDLC SERPL-MCNC: 40 MG/DL
LDLC SERPL CALC-MCNC: 142 MG/DL
NONHDLC SERPL-MCNC: 168 MG/DL
POTASSIUM SERPL-SCNC: 3.8 MMOL/L (ref 3.4–5.3)
SODIUM SERPL-SCNC: 141 MMOL/L (ref 135–145)
TRICHOMONAS, WET PREP: ABNORMAL
TRIGL SERPL-MCNC: 129 MG/DL
WBC'S/HIGH POWER FIELD, WET PREP: ABNORMAL
YEAST, WET PREP: ABNORMAL

## 2024-10-22 PROCEDURE — 80048 BASIC METABOLIC PNL TOTAL CA: CPT

## 2024-10-22 PROCEDURE — 86803 HEPATITIS C AB TEST: CPT

## 2024-10-22 PROCEDURE — 87210 SMEAR WET MOUNT SALINE/INK: CPT

## 2024-10-22 PROCEDURE — 36415 COLL VENOUS BLD VENIPUNCTURE: CPT

## 2024-10-22 PROCEDURE — 80061 LIPID PANEL: CPT

## 2024-10-22 RX ORDER — METRONIDAZOLE 7.5 MG/G
1 GEL VAGINAL DAILY
Qty: 25 G | Refills: 0 | Status: SHIPPED | OUTPATIENT
Start: 2024-10-22 | End: 2024-10-27

## 2024-11-26 ENCOUNTER — OFFICE VISIT (OUTPATIENT)
Dept: FAMILY MEDICINE | Facility: CLINIC | Age: 34
End: 2024-11-26
Payer: COMMERCIAL

## 2024-11-26 VITALS
OXYGEN SATURATION: 99 % | WEIGHT: 227.5 LBS | TEMPERATURE: 98.1 F | RESPIRATION RATE: 15 BRPM | HEART RATE: 95 BPM | DIASTOLIC BLOOD PRESSURE: 106 MMHG | SYSTOLIC BLOOD PRESSURE: 154 MMHG | BODY MASS INDEX: 38.84 KG/M2 | HEIGHT: 64 IN

## 2024-11-26 DIAGNOSIS — I10 BENIGN ESSENTIAL HYPERTENSION: ICD-10-CM

## 2024-11-26 DIAGNOSIS — M79.671 BILATERAL FOOT PAIN: ICD-10-CM

## 2024-11-26 DIAGNOSIS — G43.009 MIGRAINE WITHOUT AURA AND WITHOUT STATUS MIGRAINOSUS, NOT INTRACTABLE: Primary | ICD-10-CM

## 2024-11-26 DIAGNOSIS — M79.672 BILATERAL FOOT PAIN: ICD-10-CM

## 2024-11-26 PROCEDURE — 99214 OFFICE O/P EST MOD 30 MIN: CPT

## 2024-11-26 RX ORDER — METOPROLOL TARTRATE 25 MG/1
TABLET, FILM COATED ORAL
Qty: 84 TABLET | Refills: 0 | Status: SHIPPED | OUTPATIENT
Start: 2024-11-26 | End: 2024-12-24

## 2024-11-26 ASSESSMENT — PATIENT HEALTH QUESTIONNAIRE - PHQ9
SUM OF ALL RESPONSES TO PHQ QUESTIONS 1-9: 5
SUM OF ALL RESPONSES TO PHQ QUESTIONS 1-9: 5
10. IF YOU CHECKED OFF ANY PROBLEMS, HOW DIFFICULT HAVE THESE PROBLEMS MADE IT FOR YOU TO DO YOUR WORK, TAKE CARE OF THINGS AT HOME, OR GET ALONG WITH OTHER PEOPLE: SOMEWHAT DIFFICULT

## 2024-11-26 ASSESSMENT — ENCOUNTER SYMPTOMS: HEADACHES: 1

## 2024-11-26 ASSESSMENT — PAIN SCALES - GENERAL: PAINLEVEL_OUTOF10: MODERATE PAIN (5)

## 2024-11-26 NOTE — PROGRESS NOTES
Assessment & Plan     Migraine without aura and without status migrainosus, not intractable  - metoprolol tartrate (LOPRESSOR) 25 MG tablet; Take 1 tablet (25 mg) by mouth 2 times daily for 14 days, THEN 2 tablets (50 mg) 2 times daily for 14 days.  Patient has been having migraines on a weekly basis with daily headaches that radiate from posterior to anterior.  She may have some occipital neuralgia based on the types of headaches reported that is chronic.  She would be interested in trying a prevention medication.  She  does report previous poor tolerance of Topamax.  Given her comorbid hypertension, we could try a beta-blocker to see if this is helpful for her headache symptoms.  We could have her consider follow-up with a neurologist or pain provider if needed for treatment as well. Beta-blocker may cause fatigue.    Bilateral foot pain  - Orthopedic  Referral; Future  Unclear cause of patient's foot pain.  She reports history of arthritis, although I do not have any previous x-rays to confirm.  She is slightly tender in the MTP joint on the right foot without any significant erythema or swelling.    Benign essential hypertension  - Home Blood Pressure Monitor Order for DME - ONLY FOR DME  Possibly uncontrolled HTN. Recommend measuring HTN at home. Beta-blocker may treat this.    Sujatha Huerta is a 34 year old, presenting for the following health issues:  Headache and migraine (Also complaining her right foot)        11/26/2024    11:22 AM   Additional Questions   Roomed by loula   Accompanied by self   Getting posterior head pain that radiates to the front (both headaches and migraines). Can improve with sleep. Has been occurring everyday with a headache. Ramping up over several months. Headaches have a dull pain. Migraines are throbbing. No auras. Migraines tend to occur once per week.    Relieving factors: sleep improves both. Tylenol and ibuprofen help with migranes. Takes everyday.  History  "of fusion in neck: feels it helps with headaches.    Patient did topamax, but side effects were too much.  Tried propanolol, but doesn't remember when she quit    Will get light sensitivity and nausea when migraine occurs    Patient reports sleep has improved lately.      Arhtritis:Pain is across the top part and into arch: been going on for 1 week. Pain tends to occur immediately in the day, lasts throughout the whole day. Just on the right side. (Has some chronic left foot pain as well).  Worse with walking/stretching.  History of Present Illness       Headaches:   Since the patient's last clinic visit, headaches are: no change  The patient is getting headaches:  Daily  She is not able to do normal daily activities when she has a migraine.  The patient is taking the following rescue/relief medications:  Ibuprofen (Advil, Motrin), Naproxyn (Aleve), Tylenol and Excedrin   Patient states \"The relief is inconsistent\" from the rescue/relief medications.   The patient is taking the following medications to prevent migraines:  No medications to prevent migraines  In the past 4 weeks, the patient has gone to an Urgent Care or Emergency Room 1 time times due to headaches.    Reason for visit:  Headaches/neck pain,foot pain  Symptom onset:  More than a month  Symptoms include:  Neck pain,headaches,foot pain  Symptom intensity:  Moderate  Symptom progression:  Staying the same  Had these symptoms before:  Yes  Has tried/received treatment for these symptoms:  Yes  Previous treatment was successful:  No  What makes it worse:  Too much movement  What makes it better:  Rest,otc meds   She is taking medications regularly.           Objective    BP (!) 154/106   Pulse 95   Temp 98.1  F (36.7  C) (Temporal)   Resp 15   Ht 1.63 m (5' 4.17\")   Wt 103.2 kg (227 lb 8 oz)   SpO2 99%   BMI 38.84 kg/m    Body mass index is 38.84 kg/m .  Physical Exam   GENERAL: alert and no distress  MS: Some first joint tenderness to palpation in " the metatarsal phalangeal joint.  Diffuse tenderness to palpation is over the superior aspect of the foot and into the plantar foot in a bandlike pattern around her foot approximately 1 cm wide.  PSYCH: mentation appears normal, affect normal/bright            Signed Electronically by: James Diane, DION

## 2024-11-27 ENCOUNTER — PATIENT OUTREACH (OUTPATIENT)
Dept: CARE COORDINATION | Facility: CLINIC | Age: 34
End: 2024-11-27
Payer: COMMERCIAL

## 2024-11-29 ENCOUNTER — ANCILLARY PROCEDURE (OUTPATIENT)
Dept: GENERAL RADIOLOGY | Facility: CLINIC | Age: 34
End: 2024-11-29
Attending: PODIATRIST
Payer: COMMERCIAL

## 2024-11-29 DIAGNOSIS — M79.671 RIGHT FOOT PAIN: ICD-10-CM

## 2024-11-29 PROCEDURE — 73630 X-RAY EXAM OF FOOT: CPT | Mod: TC | Performed by: RADIOLOGY

## 2025-04-01 ENCOUNTER — OFFICE VISIT (OUTPATIENT)
Dept: FAMILY MEDICINE | Facility: CLINIC | Age: 35
End: 2025-04-01
Payer: COMMERCIAL

## 2025-04-01 VITALS
HEIGHT: 64 IN | SYSTOLIC BLOOD PRESSURE: 155 MMHG | OXYGEN SATURATION: 99 % | WEIGHT: 249 LBS | TEMPERATURE: 97.9 F | HEART RATE: 75 BPM | BODY MASS INDEX: 42.51 KG/M2 | DIASTOLIC BLOOD PRESSURE: 102 MMHG

## 2025-04-01 DIAGNOSIS — R06.83 SNORING: ICD-10-CM

## 2025-04-01 DIAGNOSIS — R63.5 WEIGHT GAIN: ICD-10-CM

## 2025-04-01 DIAGNOSIS — I10 ESSENTIAL HYPERTENSION: Primary | ICD-10-CM

## 2025-04-01 LAB
EST. AVERAGE GLUCOSE BLD GHB EST-MCNC: 100 MG/DL
HBA1C MFR BLD: 5.1 % (ref 0–5.6)

## 2025-04-01 PROCEDURE — 3077F SYST BP >= 140 MM HG: CPT

## 2025-04-01 PROCEDURE — 84443 ASSAY THYROID STIM HORMONE: CPT

## 2025-04-01 PROCEDURE — 36415 COLL VENOUS BLD VENIPUNCTURE: CPT

## 2025-04-01 PROCEDURE — 3080F DIAST BP >= 90 MM HG: CPT

## 2025-04-01 PROCEDURE — 1125F AMNT PAIN NOTED PAIN PRSNT: CPT

## 2025-04-01 PROCEDURE — 99214 OFFICE O/P EST MOD 30 MIN: CPT

## 2025-04-01 PROCEDURE — 83036 HEMOGLOBIN GLYCOSYLATED A1C: CPT

## 2025-04-01 PROCEDURE — G2211 COMPLEX E/M VISIT ADD ON: HCPCS

## 2025-04-01 PROCEDURE — 80053 COMPREHEN METABOLIC PANEL: CPT

## 2025-04-01 RX ORDER — HYDROCHLOROTHIAZIDE 25 MG/1
25 TABLET ORAL DAILY
Qty: 30 TABLET | Refills: 1 | Status: SHIPPED | OUTPATIENT
Start: 2025-04-01

## 2025-04-01 ASSESSMENT — PAIN SCALES - GENERAL: PAINLEVEL_OUTOF10: MODERATE PAIN (4)

## 2025-04-01 NOTE — PROGRESS NOTES
"  Assessment & Plan   Essential hypertension  - hydrochlorothiazide (HYDRODIURIL) 25 MG tablet; Take 1 tablet (25 mg) by mouth daily.  - BP remains elevated despite metoprolol.   Chronic, uncontrolled  Patient reports infrequently taking her BP at home but when she has, they have been consistent with in office readings. Patient also reports leg swelling so will plan to avoid nifedipine for now. Informed of potential for hyponatremia with diuretics. Hydrochlorothiazide ordered, consider changing medication if patient decides to try to conceive.  Could consider switching to labetolol if conceiving as nifedipine could worse her pedal edema.    Weight gain  - Hemoglobin A1c; Future  - TSH with free T4 reflex; Future  - Comprehensive metabolic panel (BMP + Alb, Alk Phos, ALT, AST, Total. Bili, TP); Future  - semaglutide-weight management (WEGOVY) 0.25 MG/0.5ML pen; Inject 0.5 mLs (0.25 mg) subcutaneously once a week.  - Patient reports a 30 lb weight gain in the past 4 months since quitting vaping. Reports feeling fatigued daily. Will draw TSH to rule out thyroid imbalance. No recent A1c on file. Will draw A1c to assess for diabetes in consideration for GLP1 prescribing.  - Wegovy prescribed for weight loss to see if needs prior authorization. Discussed side effects included GI upset, abdominal pain, and loss of muscle. Discussed dosage titration. Will consider zepbound is not approved by insurance.    Snoring  - Adult Sleep Eval & Management  Referral; Future  - Patient's partner reports that her snoring has gotten worse, STOP BANG calculated to be 5. Patient reports sleeping ~5 hours per night and not feeling well rested. Sleep evaluation referral placed for assessment of sleep apnea.    BMI  Estimated body mass index is 42.74 kg/m  as calculated from the following:    Height as of this encounter: 1.626 m (5' 4\").    Weight as of this encounter: 112.9 kg (249 lb).   Weight management plan: Discussed healthy " diet and exercise guidelines    Subjective   Deanna is a 35 year old, presenting for the following health issues:  Has four small kids     No cold intolerance, constipation.  No thyroid cancer in family history.    Quit vaping after last appt., has been eating as an act of distraction and to get the sweet flavors she's missing with vaping  -Eats spicy and sweet food, drinks an extra large fountain pop and 2-3 cups of coffee with sweetened creamers   Has gained 30 lbs in the 4 months since her appt in Nov  Joined a gym - has been walking on the treadmill and light weight lifting. 2-3x.week-aiming for 150 minutes total in the week. Aiming for 45-60 minutes total  *interested in ozempic or surgery (her mom had surgery)  -had a high school classmate die of a heart attack, has small kids so she is scared of that  -daily leg swelling, mild when she wakes, gets worse as the day goes on, elevated her legs at the end of the day - minimal decrease   -drinks ~48 oz a day  -reports heart palpitations (doesn't know if it's anxiety), her  said her snoring has gotten worse, has not mentioned that she stops breathing, sleeps ~5 hours per night, does not feel well rested (has never has a sleep study)  -denies SOB, chest pain  -headaches are a little better, not debilitating, has a debilitation HA once every other week, mild baseline HA daily  -foot pain has been a lot better    Hypertension and Weight      4/1/2025    11:04 AM   Additional Questions   Roomed by Arleen LEÓN     History of Present Illness       Hypertension: She presents for follow up of hypertension.  She does not check blood pressure  regularly outside of the clinic. Outside blood pressures have been over 140/90. She does not follow a low salt diet.     She eats 0-1 servings of fruits and vegetables daily.She consumes 3 sweetened beverage(s) daily.She exercises with enough effort to increase her heart rate 30 to 60 minutes per day.  She exercises with enough  "effort to increase her heart rate 3 or less days per week.   She is taking medications regularly.         Objective    BP (!) 155/102 (BP Location: Right arm, Patient Position: Sitting, Cuff Size: Adult Large)   Pulse 75   Temp 97.9  F (36.6  C) (Temporal)   Ht 1.626 m (5' 4\")   Wt 112.9 kg (249 lb)   LMP 03/28/2025   SpO2 99%   BMI 42.74 kg/m    Body mass index is 42.74 kg/m .  Physical Exam   GENERAL: alert and no distress  RESP: lungs clear to auscultation - no rales, rhonchi or wheezes  CV: regular rate and rhythm, normal S1 S2, no S3 or S4, no murmur, click or rub  NEURO: mentation intact and speech normal  PSYCH: affect normal/bright      Valeria Haines, Student Nurse Midwife   I have reviewed the note written by the student and agree with the findings and plan presented.  Signed Electronically by: James Diane NP    "

## 2025-04-02 ENCOUNTER — TELEPHONE (OUTPATIENT)
Dept: FAMILY MEDICINE | Facility: CLINIC | Age: 35
End: 2025-04-02
Payer: COMMERCIAL

## 2025-04-02 LAB
ALBUMIN SERPL BCG-MCNC: 4.5 G/DL (ref 3.5–5.2)
ALP SERPL-CCNC: 116 U/L (ref 40–150)
ALT SERPL W P-5'-P-CCNC: 43 U/L (ref 0–50)
ANION GAP SERPL CALCULATED.3IONS-SCNC: 13 MMOL/L (ref 7–15)
AST SERPL W P-5'-P-CCNC: 38 U/L (ref 0–45)
BILIRUB SERPL-MCNC: 0.3 MG/DL
BUN SERPL-MCNC: 6.5 MG/DL (ref 6–20)
CALCIUM SERPL-MCNC: 9.5 MG/DL (ref 8.8–10.4)
CHLORIDE SERPL-SCNC: 105 MMOL/L (ref 98–107)
CREAT SERPL-MCNC: 0.78 MG/DL (ref 0.51–0.95)
EGFRCR SERPLBLD CKD-EPI 2021: >90 ML/MIN/1.73M2
GLUCOSE SERPL-MCNC: 84 MG/DL (ref 70–99)
HCO3 SERPL-SCNC: 24 MMOL/L (ref 22–29)
POTASSIUM SERPL-SCNC: 3.9 MMOL/L (ref 3.4–5.3)
PROT SERPL-MCNC: 7.5 G/DL (ref 6.4–8.3)
SODIUM SERPL-SCNC: 142 MMOL/L (ref 135–145)
TSH SERPL DL<=0.005 MIU/L-ACNC: 1.78 UIU/ML (ref 0.3–4.2)

## 2025-04-02 NOTE — TELEPHONE ENCOUNTER
Prior Authorization Retail Medication Request    Medication/Dose: Wegovy 0.25mg/0.5ml  Diagnosis and ICD code (if different than what is on RX):    New/renewal/insurance change PA/secondary ins. PA:  Previously Tried and Failed:    Rationale:  Prior Authorization needed    Insurance   Primary: [employer]  Insurance ID:  [id number]    Thank you!  Luz Marina Davalos [facility]  Phone: [phone]  Fax: [phone]

## 2025-04-03 NOTE — TELEPHONE ENCOUNTER
Retail Pharmacy Prior Authorization Team   Phone: [phone]    Prior Authorization Approval    Medication: WEGOVY 0.25 MG/0.5ML SC SOAJ  Authorization Effective Date: 3/4/2025  Authorization Expiration Date: 10/30/2025  Reference #: [id number]    Insurance Company: [employer] - Phone [phone] Fax [phone]  Which Pharmacy is filling the prescription: [facility] [place], [place] - [facility] [address]  Pharmacy Notified: YES  Patient Notified: **Instructed pharmacy to notify patient when script is ready to /ship.**

## 2025-04-17 ENCOUNTER — OFFICE VISIT (OUTPATIENT)
Dept: URGENT CARE | Facility: URGENT CARE | Age: 35
End: 2025-04-17
Payer: COMMERCIAL

## 2025-04-17 VITALS
SYSTOLIC BLOOD PRESSURE: 133 MMHG | OXYGEN SATURATION: 98 % | TEMPERATURE: 97.6 F | BODY MASS INDEX: 40.42 KG/M2 | DIASTOLIC BLOOD PRESSURE: 94 MMHG | RESPIRATION RATE: 18 BRPM | HEIGHT: 65 IN | HEART RATE: 96 BPM | WEIGHT: 242.6 LBS

## 2025-04-17 DIAGNOSIS — R68.84 JAW PAIN: ICD-10-CM

## 2025-04-17 DIAGNOSIS — M26.609 TEMPOROMANDIBULAR JOINT DISORDER: Primary | ICD-10-CM

## 2025-04-17 RX ORDER — CYCLOBENZAPRINE HCL 10 MG
10 TABLET ORAL
Qty: 20 TABLET | Refills: 0 | Status: SHIPPED | OUTPATIENT
Start: 2025-04-17

## 2025-04-17 RX ORDER — INDOMETHACIN 50 MG/1
50 CAPSULE ORAL 2 TIMES DAILY WITH MEALS
Qty: 20 CAPSULE | Refills: 0 | Status: SHIPPED | OUTPATIENT
Start: 2025-04-17

## 2025-04-17 NOTE — PROGRESS NOTES
Assessment & Plan     Temporomandibular joint disorder    Temporomandibular disorders (TMDs) are problems with the muscles and joints that connect your jaw to your skull. These problems cause pain when you talk, chew, swallow, or yawn. You may feel this pain on one or both sides.  TMDs are often caused by tight jaw muscles. The tightness can be caused by clenching or grinding your teeth.  Lowering stress may help relax your jaw and reduce your pain. Your doctor may suggest a dental splint. Splints can help protect the teeth from grinding and clenching.  You may be able to do some things at home to feel better. If that doesn't work for you, your doctor may prescribe medicine to help relax your muscles and control the pain.    - cyclobenzaprine (FLEXERIL) 10 MG tablet  Dispense: 20 tablet; Refill: 0  - indomethacin (INDOCIN) 50 MG capsule  Dispense: 20 capsule; Refill: 0    Jaw pain    How can you care for yourself at home?  Put an ice pack or a warm, moist cloth on your jaw for 15 minutes. Do this several times a day. Try switching back and forth between moist heat and cold.  Ask your doctor if you can take an over-the-counter pain medicine, such as acetaminophen (Tylenol), ibuprofen (Advil, Motrin), or naproxen (Aleve). Be safe with medicines. Read and follow all instructions on the label.  Choose softer foods, such as eggs, yogurt, soup, or pureed foods. Try to avoid hard foods. Cut food into small pieces.  If it doesn't cause pain, practice relaxing your jaw. Gently open and close your mouth. Move your jaw straight up and down. Do this for a few minutes every morning and evening. Watching yourself in a mirror can help.  Have good posture. Try to line up your ears, shoulders, and hips when sitting and standing.    - cyclobenzaprine (FLEXERIL) 10 MG tablet  Dispense: 20 tablet; Refill: 0  - indomethacin (INDOCIN) 50 MG capsule  Dispense: 20 capsule; Refill: 0       Advised to follow up with dentistry to talk about a  "bite block or mouth guard if symptoms persist    No follow-ups on file.    Umberto Escobedo, [facility], PAJOSE  [facility] JAMES Huerta is a 35 year old female who presents to clinic today for the following health issues:  Chief Complaint   Patient presents with    Jaw Pain     Patient is here for left jaw pain that started 1 week ago that has not gotten better. Patient states she grinds her teeth in her sleep.         4/17/2025     6:02 PM   Additional Questions   Roomed by Antonietta Fox   Accompanied by Self         4/17/2025     6:02 PM   Patient Reported Additional Medications   Patient reports taking the following new medications Hydroxychlorothiazide 25mg daily     HPI  Review of Systems  Constitutional, HEENT, cardiovascular, pulmonary, gi and gu systems are negative, except as otherwise noted.      Objective    BP (!) 133/94 (BP Location: Right arm, Patient Position: Sitting, Cuff Size: Adult Large)   Pulse 96   Temp 97.6  F (36.4  C) (Tympanic)   Resp 18   Ht 1.657 m (5' 5.25\")   Wt 110 kg (242 lb 9.6 oz)   LMP 03/28/2025   SpO2 98%   BMI 40.06 kg/m    Physical Exam   GENERAL: alert and no distress  EYES: Eyes grossly normal to inspection, PERRL and conjunctivae and sclerae normal  HENT: ear canals and TM's normal, nose and mouth without ulcers or lesions  NECK: no adenopathy, no asymmetry, masses, or scars  RESP: lungs clear to auscultation - no rales, rhonchi or wheezes  CV: regular rate and rhythm, normal S1 S2, no S3 or S4, no murmur, click or rub, no peripheral edema  MS: pos for left TMJ pain, pain with clinching teeth  SKIN: no suspicious lesions or rashes  NEURO: Normal strength and tone, mentation intact and speech normal  PSYCH: mentation appears normal, affect normal/bright      No results found for any visits on 04/17/25.        "

## 2025-04-17 NOTE — PROGRESS NOTES
Urgent Care Clinic Visit    Chief Complaint   Patient presents with    Jaw Pain     Patient is here for left jaw pain that started 1 week ago that has not gotten better. Patient states she grinds her teeth in her sleep.               4/17/2025     6:02 PM   Additional Questions   Roomed by Antonietta Fox

## 2025-04-28 ENCOUNTER — MYC MEDICAL ADVICE (OUTPATIENT)
Dept: FAMILY MEDICINE | Facility: CLINIC | Age: 35
End: 2025-04-28
Payer: COMMERCIAL

## 2025-04-28 DIAGNOSIS — E66.813 CLASS 3 SEVERE OBESITY WITH BODY MASS INDEX (BMI) OF 40.0 TO 44.9 IN ADULT (H): Primary | ICD-10-CM

## 2025-04-28 NOTE — TELEPHONE ENCOUNTER
Clinic RN: Please investigate patient's chart or contact patient if the information cannot be found because the last prescription was a taper dose (not in RN protocol). We need to know what dose patient is taking. Determine the current dose, then route to provider and ask provider to update the SIG and approve or deny the prescription.  Thanks,  Calli OLSEN RN

## 2025-04-30 DIAGNOSIS — G43.009 MIGRAINE WITHOUT AURA AND WITHOUT STATUS MIGRAINOSUS, NOT INTRACTABLE: ICD-10-CM

## 2025-05-01 RX ORDER — METOPROLOL TARTRATE 25 MG/1
50 TABLET, FILM COATED ORAL 2 TIMES DAILY
Qty: 60 TABLET | Refills: 11 | Status: SHIPPED | OUTPATIENT
Start: 2025-05-01

## 2025-05-15 ENCOUNTER — OFFICE VISIT (OUTPATIENT)
Dept: FAMILY MEDICINE | Facility: CLINIC | Age: 35
End: 2025-05-15
Payer: COMMERCIAL

## 2025-05-15 VITALS
TEMPERATURE: 97.7 F | OXYGEN SATURATION: 97 % | RESPIRATION RATE: 10 BRPM | SYSTOLIC BLOOD PRESSURE: 122 MMHG | HEART RATE: 95 BPM | BODY MASS INDEX: 40.2 KG/M2 | DIASTOLIC BLOOD PRESSURE: 85 MMHG | HEIGHT: 64 IN | WEIGHT: 235.5 LBS

## 2025-05-15 DIAGNOSIS — E66.813 CLASS 3 SEVERE OBESITY WITH BODY MASS INDEX (BMI) OF 40.0 TO 44.9 IN ADULT (H): ICD-10-CM

## 2025-05-15 DIAGNOSIS — I10 BENIGN ESSENTIAL HYPERTENSION: Primary | ICD-10-CM

## 2025-05-15 LAB
CREAT SERPL-MCNC: 0.84 MG/DL (ref 0.51–0.95)
EGFRCR SERPLBLD CKD-EPI 2021: >90 ML/MIN/1.73M2
POTASSIUM SERPL-SCNC: 3.1 MMOL/L (ref 3.4–5.3)
SODIUM SERPL-SCNC: 142 MMOL/L (ref 135–145)

## 2025-05-15 ASSESSMENT — PAIN SCALES - GENERAL: PAINLEVEL_OUTOF10: NO PAIN (0)

## 2025-05-15 NOTE — PROGRESS NOTES
"  Assessment & Plan     Benign essential hypertension  - Sodium; Future  - Creatinine; Future  - Potassium; Future  - Sodium  - Creatinine  - Potassium  Improved  Improved on hydrochlorothiazide. Leg edema also improved.    Class 3 severe obesity with body mass index (BMI) of 40.0 to 44.9 in adult (H)  - Semaglutide-Weight Management (WEGOVY) 1 MG/0.5ML pen; Inject 1 mg subcutaneously once a week.  15 pound weight loss over about 1.5  months. Doing well. Had diarrhea that improved. Will continue titration.        Subjective   Deanna is a 35 year old, presenting for the following health issues:  Follow Up (Weight and bp)      5/15/2025    11:29 AM   Additional Questions   Roomed by Radha PURI     BP: 120s/80s at home. Taking hydrochlorothiazide on a regular basis.     Wegovy: had 1.5 weeks of diarrhea. Has had 15 pound weight loss. Keeping up on protein intake.  History of Present Illness       Hypertension: She presents for follow up of hypertension.  She does check blood pressure  regularly outside of the clinic. Outside blood pressures have been over 140/90. She does not follow a low salt diet.     Reason for visit:  Blood pressure and weight    She eats 0-1 servings of fruits and vegetables daily.She consumes 3 sweetened beverage(s) daily.She exercises with enough effort to increase her heart rate 20 to 29 minutes per day.  She exercises with enough effort to increase her heart rate 3 or less days per week. She is missing 1 dose(s) of medications per week.  She is not taking prescribed medications regularly due to remembering to take.              Objective    /85   Pulse 95   Temp 97.7  F (36.5  C) (Temporal)   Resp 10   Ht 1.626 m (5' 4\")   Wt 106.8 kg (235 lb 8 oz)   LMP 05/02/2025 (Exact Date)   SpO2 97%   BMI 40.42 kg/m    Body mass index is 40.42 kg/m .  Physical Exam   GENERAL: alert and no distress  RESP: lungs clear to auscultation - no rales, rhonchi or wheezes  CV: regular rate and rhythm, " normal S1 S2, no S3 or S4, no murmur, click or rub, no peripheral edema   PSYCH: mentation appears normal, affect normal/bright            Signed Electronically by: James Diane NP

## 2025-05-16 ENCOUNTER — RESULTS FOLLOW-UP (OUTPATIENT)
Dept: FAMILY MEDICINE | Facility: CLINIC | Age: 35
End: 2025-05-16
Payer: COMMERCIAL

## 2025-05-16 DIAGNOSIS — E87.6 HYPOKALEMIA: Primary | ICD-10-CM

## 2025-05-27 ENCOUNTER — LAB (OUTPATIENT)
Dept: LAB | Facility: CLINIC | Age: 35
End: 2025-05-27
Payer: COMMERCIAL

## 2025-05-27 ENCOUNTER — OFFICE VISIT (OUTPATIENT)
Dept: SLEEP MEDICINE | Facility: CLINIC | Age: 35
End: 2025-05-27
Payer: COMMERCIAL

## 2025-05-27 VITALS
OXYGEN SATURATION: 98 % | HEART RATE: 76 BPM | RESPIRATION RATE: 16 BRPM | WEIGHT: 235 LBS | BODY MASS INDEX: 40.12 KG/M2 | HEIGHT: 64 IN | DIASTOLIC BLOOD PRESSURE: 83 MMHG | SYSTOLIC BLOOD PRESSURE: 120 MMHG

## 2025-05-27 DIAGNOSIS — E87.6 HYPOKALEMIA: ICD-10-CM

## 2025-05-27 DIAGNOSIS — G47.19 EXCESSIVE DAYTIME SLEEPINESS: ICD-10-CM

## 2025-05-27 DIAGNOSIS — G25.81 RLS (RESTLESS LEGS SYNDROME): Primary | ICD-10-CM

## 2025-05-27 DIAGNOSIS — F51.12 INSUFFICIENT SLEEP SYNDROME: ICD-10-CM

## 2025-05-27 DIAGNOSIS — R06.83 SNORING: ICD-10-CM

## 2025-05-27 DIAGNOSIS — G25.81 RLS (RESTLESS LEGS SYNDROME): ICD-10-CM

## 2025-05-27 PROCEDURE — 83550 IRON BINDING TEST: CPT

## 2025-05-27 PROCEDURE — 3074F SYST BP LT 130 MM HG: CPT | Performed by: INTERNAL MEDICINE

## 2025-05-27 PROCEDURE — 99204 OFFICE O/P NEW MOD 45 MIN: CPT | Performed by: INTERNAL MEDICINE

## 2025-05-27 PROCEDURE — 83540 ASSAY OF IRON: CPT

## 2025-05-27 PROCEDURE — 1126F AMNT PAIN NOTED NONE PRSNT: CPT | Performed by: INTERNAL MEDICINE

## 2025-05-27 PROCEDURE — 3079F DIAST BP 80-89 MM HG: CPT | Performed by: INTERNAL MEDICINE

## 2025-05-27 PROCEDURE — 82728 ASSAY OF FERRITIN: CPT

## 2025-05-27 PROCEDURE — 84132 ASSAY OF SERUM POTASSIUM: CPT

## 2025-05-27 ASSESSMENT — SLEEP AND FATIGUE QUESTIONNAIRES
HOW LIKELY ARE YOU TO NOD OFF OR FALL ASLEEP WHILE SITTING QUIETLY AFTER LUNCH WITHOUT ALCOHOL: SLIGHT CHANCE OF DOZING
HOW LIKELY ARE YOU TO NOD OFF OR FALL ASLEEP IN A CAR, WHILE STOPPED FOR A FEW MINUTES IN TRAFFIC: WOULD NEVER DOZE
HOW LIKELY ARE YOU TO NOD OFF OR FALL ASLEEP WHILE SITTING AND TALKING TO SOMEONE: WOULD NEVER DOZE
HOW LIKELY ARE YOU TO NOD OFF OR FALL ASLEEP WHILE SITTING INACTIVE IN A PUBLIC PLACE: SLIGHT CHANCE OF DOZING
HOW LIKELY ARE YOU TO NOD OFF OR FALL ASLEEP WHILE WATCHING TV: SLIGHT CHANCE OF DOZING
HOW LIKELY ARE YOU TO NOD OFF OR FALL ASLEEP WHILE SITTING AND READING: MODERATE CHANCE OF DOZING
HOW LIKELY ARE YOU TO NOD OFF OR FALL ASLEEP WHILE LYING DOWN TO REST IN THE AFTERNOON WHEN CIRCUMSTANCES PERMIT: HIGH CHANCE OF DOZING
HOW LIKELY ARE YOU TO NOD OFF OR FALL ASLEEP WHEN YOU ARE A PASSENGER IN A CAR FOR AN HOUR WITHOUT A BREAK: HIGH CHANCE OF DOZING

## 2025-05-27 NOTE — PROGRESS NOTES
[facility] [address] [address]  Phone: [phone]    Patient:  Deanna Hernandez, Date of birth 1990  Date of Visit:  05/27/2025  Referring Provider James Diane                Reason for Sleep Consult:     Deanna Hernandez is a 35 year old female for complaints of reported snoring, poor and interrupted sleep and symptoms of restlessness in her legs for several years.         Assessment and Plan:   35 years old female with history of class III obesity, Castleman disease, major depression, generalized anxiety disorder, polycystic ovarian syndrome, history of nicotine dependence, hypertension is currently undergoing weight management therapy with Wegovy.  She recently brought up her overall poor sleep quality, frequent nocturnal awakenings and significant daytime sleepiness for which she has been referred to the sleep center.  Summary Sleep Diagnoses:    Poor sleep.  She is reporting of difficulty falling asleep, nocturnal awakenings and difficulty falling back to sleep.  Poor sleep initiation is likely being triggered by symptoms of restlessness in her legs which may be also contributing towards her nocturnal arousals.  Furthermore, there is also concern for sleep disordered breathing triggering nocturnal arousals and overall causing overall poor sleep quality and daytime sleepiness.  Restless leg syndrome. Deanna is endorsing history which is quite suggestive for restless leg syndrome and possibly periodic limb movements during her sleep impacting on the quality of her sleep.  Sleep disordered breathing.  Her history is quite suggestive with a high pretest probability for sleep disordered breathing.  The class III obesity is an independent risk factor which may be further contributing to his complicated sleep disordered breathing including his sleep-related hypoventilation.  She is currently taking Wegovy.  A significant weight loss over 15% can  impact the severity of presence of obstructive sleep apnea and will require a reevaluation in future.    Insufficient sleep.  She reports of chronic sleep deprivation and insufficient sleep for the past several years.  On average, during the workweek she gets just around 6 hours of sleep at night with her natural sleep requirement between 8 to 9 hours.  The cumulative deficit can be over 10 to 12 hours in a week with minimal to no compensation over the weekends.        Comorbid Diagnoses:    Class III obesity.  Hypertension.  Castleman disease.  Generalized anxiety disorder.  Major depression.  Polycystic ovarian syndrome.    Summary Recommendations:    Serum sample to assess iron levels, iron saturation and ferritin.  Type I polysomnogram to assess the quality of sleep and identify the cause which could be periodic limb movements, sleep disordered breathing.  Deanna is strongly advised to avoid driving or operating heavy machinery when drowsy.    Summary Counseling:  See instructions    Counseling included a comprehensive review of diagnostic and therapeutic strategies as well as risks of inadequate therapy.  Educational materials provided in instructions.             History of Present Illness:     Deanna Hernandez is a 35 year old female with above-mentioned medical history is recently undergoing a weight management program with Wegovy and has lost up to 30 pounds over the past 2 months.  Deanna has struggled with poor sleep for several years.  She is a mother of 4 with a child with pervasive disorder.  For the past 11 years she has struggled to go to bed earlier than midnight.  During the workweek she has to wake up at 6 AM and over the weekend she usually is awakened by her children around 7:30 AM to 8 AM.  During the night she wakes up once or twice either due to symptoms of restlessness in her legs or for unexplained reasons.  2-3 times in a week she will have some difficulty falling back to sleep which can  last longer than half an hour.  She denies symptoms of hypnagogic, hypnopompic hallucinations, sleep paralysis or cataplexy.  She denies symptoms of orthopnea or paroxysmal nocturnal dyspnea.          SLEEP-WAKE SCHEDULE:     Deanna Hernandez     -Describes themself as neither a morning or night person;  prefer to go to sleep at 12:00 PM and wake up at 8:00 AM.     -Naps 0 times per day; takes some inadvertant naps.     Deanna Hernandez    -ON WEEKDAYS, goes to sleep at 12:00 AM during the week; awakens  6:00 AM with an alarm; falls asleep in 60 minutes; has difficulty falling asleep.     -ON WEEKENDS, goes to sleep at 12:00 AM.  He wakes up at 8:00 AM without an alarm by her children; falls asleep in 60 minutes.       -Awakens 1-2 times a night for 20 minutes before falling back to sleep; awakens to restless in legs and uncertain reasons.      She does not feel rested in the morning.    [place] Sleepiness Scale: 11/24      AMADA Total Score: (Patient-Rptd) 15    SCALES       SLEEP APNEA: Stopbang score: 5         INSOMNIA:  Insomnia severity score: 15       SLEEPINESS: [place] sleepiness scale: 11 [normal < 11]   Drowsy driving/near accidents: Denies.  Consequences: Not applicable    SLEEP COMPLAINTS:  Cardio-respiratory    Snoring: Reported loud snoring nightly/week  Dyspnea: Denies  Morning headaches or confusion: Occasional.  Coexisting Lung disease: Denies    Coexisting Heart disease: Denies    Does patient have a bed partner: Sleeps with her   Has bed partner been sleeping separately because of snoring: Denies            RLS Screen: When you try to relax in the evening or sleep at  night, do you ever have unpleasant, restless feelings in your  legs that can be relieved by walking or movement?  Does report of restlessness in her legs that interferes with her sleep on a nightly basis.      Periodic limb movement: Reports of waking up due to restlessness in her legs.    Narcolepsy: Denies sudden urges of  sleep attacks  Cataplexy: Denies   Sleep paralysis: Denies    Hallucinations:   Denies       Sleep Behaviors:  Leg symptoms/movements: See above  Motor restlessness: Occasional  Night terrors: Denies  Bruxism: Denies  Automatic behaviors: None    Other subjective complaints:  Anxiety or rumination: Denies  Pain and discomfort at  night: Denies  Waking up with heart pounding or racing: Denies  GERD or aspiration: Denies         Parasomnia:   NREM: Denies recurrent persistent confusional arousal, night eating, sleep walking or sleep terrors   REM: Denies dream enactment; injuries          Medications:     Current Outpatient Medications   Medication Sig Dispense Refill    acetaminophen (TYLENOL) 500 MG tablet Take 2 tablets (1,000 mg) by mouth daily as needed for mild pain 100 tablet 0    hydrochlorothiazide (HYDRODIURIL) 25 MG tablet Take 1 tablet (25 mg) by mouth daily. 30 tablet 1    ibuprofen (ADVIL/MOTRIN) 600 MG tablet Take 600 mg by mouth every 6 hours as needed      metoprolol tartrate (LOPRESSOR) 25 MG tablet Take 2 tablets (50 mg) by mouth 2 times daily. 60 tablet 11    naproxen (NAPROSYN) 500 MG tablet       Semaglutide-Weight Management (WEGOVY) 1 MG/0.5ML pen Inject 1 mg subcutaneously once a week. 2 mL 0    sertraline (ZOLOFT) 50 MG tablet Take 50 mg by mouth daily.       Current Facility-Administered Medications   Medication Dose Route Frequency Provider Last Rate Last Admin    medroxyPROGESTERone (DEPO-PROVERA) injection 150 mg  150 mg Intramuscular Q90 Days    150 mg at 09/26/24 1251        Allergies   Allergen Reactions    Bee Anaphylaxis and Swelling     Swelling of arm as a child              Past Medical History:     Does not need 02 supplement at night   Past Medical History:   Diagnosis Date    Normal spontaneous vaginal delivery 11/4/2019             Past Surgical History:    Previous upper airway surgery: None.  Past Surgical History:   Procedure Laterality Date    BIOPSY/REMOVAL, LYMPH NODE(S)       left neck    CERVICAL FUSION      CHOLECYSTECTOMY              Social History:     Social History     Tobacco Use    Smoking status: Former     Types: Vaping Device     Passive exposure: Past    Smokeless tobacco: Never   Substance Use Topics    Alcohol use: Not Currently         Chemical History:     Tobacco: Quit smoking 8 years ago and was vaping which she quit 6 months ago.     Uses 2 cups/day of coffee. Last caffeine intake is usually before 2 PM    Supplements for wakefulness: Mountain Dew 1 can at 5 PM    EtOH: Denies.    Recreational Drugs: Denies.    Psych Hx:   Major depression, generalized anxiety disorder.  Current dangers to self or others:none           Family History:     Family History   Problem Relation Age of Onset    Hypertension Mother     Mental Illness Mother         Borderline    Alcoholism Mother     No Known Problems Father     Cancer Maternal Grandfather         lung    Breast Cancer No family hx of     Colorectal Cancer No family hx of     Coronary Artery Disease No family hx of     Coronary Artery Disease Early Onset No family hx of     Diabetes No family hx of         Sleep Family Hx:        RLS-unaware   KAYCE - Father  Insomnia -unaware  Parasomnia -unaware         Review of Systems:     A complete 10 point review of systems was negative other than HPI or as commented below:   Deanna Hernandez has gained 10-15 pounds in the last 2 months.      CONSTITUTIONAL: NEGATIVE for weight gain/loss, fever, chills, sweats or night sweats, drug allergies.  EYES: NEGATIVE for changes in vision, blind spots, double vision.  ENT: NEGATIVE for ear pain, sore throat, sinus pain, post-nasal drip, runny nose, bloody nose  CARDIAC: NEGATIVE for fast heartbeats or fluttering in chest, chest pain or pressure, breathlessness when lying flat, swollen legs or swollen feet.  NEUROLOGIC: NEGATIVE headaches, weakness or numbness in the arms or legs.  DERMATOLOGIC: NEGATIVE for rashes, new moles or change in  "mole(s)  PULMONARY: NEGATIVE SOB at rest, SOB with activity, dry cough, productive cough, coughing up blood, wheezing or whistling when breathing.    GASTROINTESTINAL: NEGATIVE for nausea or vomitting, loose or watery stools, fat or grease in stools, constipation, abdominal pain, bowel movements black in color or blood noted.  GENITOURINARY: NEGATIVE for pain during urination, blood in urine, urinating more frequently than usual, irregular menstrual periods.  MUSCULOSKELETAL: NEGATIVE for muscle pain, bone or joint pain, swollen joints.  ENDOCRINE: NEGATIVE for increased thirst or urination, diabetes.  LYMPHATIC: NEGATIVE for swollen lymph nodes, lumps or bumps in the breasts or nipple discharge.         Physical Examination:     /83   Pulse 76   Resp 16   Ht 1.626 m (5' 4.02\")   Wt 106.6 kg (235 lb)   LMP 05/02/2025 (Exact Date)   SpO2 98%   BMI 40.32 kg/m    GENERAL: alert and no distress  HEENT: Eyes grossly normal to inspection.  No discharge or erythema, or obvious scleral/conjunctival abnormalities.  Mallampati score 3  RESP: No audible wheeze, cough, or visible cyanosis.    SKIN: Visible skin clear. No significant rash, abnormal pigmentation or lesions.  NEURO: Cranial nerves grossly intact.  Mentation and speech appropriate for age.  PSYCH: Appropriate affect, tone, and pace of words          Data: All pertinent previous laboratory data reviewed     No results found for: \"PH\", \"PHARTERIAL\", \"PO2\", \"[id number]\", \"SAT\", \"PCO2\", \"HCO3\", \"BASEEXCESS\", \"VAIBHAV\", \"BEB\"  Lab Results   Component Value Date    TSH 1.78 04/01/2025     Lab Results   Component Value Date    GLC 84 04/01/2025    GLC 91 10/22/2024     Lab Results   Component Value Date    HGB 13.3 03/07/2024     Lab Results   Component Value Date    BUN 6.5 04/01/2025    BUN 5.6 (L) 10/22/2024    CR 0.84 05/15/2025    CR 0.78 04/01/2025     Lab Results   Component Value Date    AST 38 04/01/2025    ALT 43 04/01/2025    ALKPHOS 116 04/01/2025    " BILITOTAL 0.3 04/01/2025     Addendum:    Latest Reference Range & Units 05/27/25 15:29   Ferritin 6 - 175 ng/mL 84   Iron 37 - 145 ug/dL 81   Iron Binding Capacity 240 - 430 ug/dL 324   Iron Sat Index 15 - 46 % 25   Start supplemental iron with ascorbic acid for low normal Ferritin levels.    Copy to: James Diane MD 5/27/2025     Total of 45 minutes of time was spent with patient, this includes the interview and exam, and review of the chart/labs/imaging/sleep study/PAP therapy and pertinent clinical data on 05/27/2025. This also includes (greater than 50%) time spent counseling and coordinating care.

## 2025-05-27 NOTE — NURSING NOTE
"Chief Complaint   Patient presents with    Sleep Problem     Snoring        Initial /83   Pulse 76   Resp 16   Ht 1.626 m (5' 4.02\")   Wt 106.6 kg (235 lb)   LMP 05/02/2025 (Exact Date)   SpO2 98%   BMI 40.32 kg/m   Estimated body mass index is 40.32 kg/m  as calculated from the following:    Height as of this encounter: 1.626 m (5' 4.02\").    Weight as of this encounter: 106.6 kg (235 lb).    Medication Reconciliation: complete    Neck circumference: 16.5 inches / 42 centimeters.  ESS: 11  DME: N/A      ANN Haddad      "

## 2025-05-28 DIAGNOSIS — E87.6 HYPOKALEMIA: Primary | ICD-10-CM

## 2025-05-28 LAB
FERRITIN SERPL-MCNC: 84 NG/ML (ref 6–175)
IRON BINDING CAPACITY (ROCHE): 324 UG/DL (ref 240–430)
IRON SATN MFR SERPL: 25 % (ref 15–46)
IRON SERPL-MCNC: 81 UG/DL (ref 37–145)
POTASSIUM SERPL-SCNC: 3.3 MMOL/L (ref 3.4–5.3)

## 2025-05-28 RX ORDER — FERROUS SULFATE 325(65) MG
325 TABLET ORAL
Qty: 90 TABLET | Refills: 0 | Status: SHIPPED | OUTPATIENT
Start: 2025-05-28 | End: 2025-08-26

## 2025-05-28 RX ORDER — POTASSIUM CHLORIDE 750 MG/1
10 CAPSULE, EXTENDED RELEASE ORAL 2 TIMES DAILY
Qty: 180 CAPSULE | Refills: 1 | Status: SHIPPED | OUTPATIENT
Start: 2025-05-28

## 2025-05-28 RX ORDER — MULTIVIT WITH MINERALS/LUTEIN
250 TABLET ORAL DAILY
Qty: 90 TABLET | Refills: 0 | Status: SHIPPED | OUTPATIENT
Start: 2025-05-28 | End: 2025-08-26

## 2025-06-12 DIAGNOSIS — I10 ESSENTIAL HYPERTENSION: ICD-10-CM

## 2025-06-12 RX ORDER — HYDROCHLOROTHIAZIDE 25 MG/1
25 TABLET ORAL DAILY
Qty: 30 TABLET | Refills: 9 | Status: SHIPPED | OUTPATIENT
Start: 2025-06-12

## 2025-07-01 ENCOUNTER — E-VISIT (OUTPATIENT)
Dept: FAMILY MEDICINE | Facility: CLINIC | Age: 35
End: 2025-07-01
Payer: COMMERCIAL

## 2025-07-01 DIAGNOSIS — E66.813 CLASS 3 SEVERE OBESITY WITH BODY MASS INDEX (BMI) OF 40.0 TO 44.9 IN ADULT (H): Primary | ICD-10-CM

## 2025-07-01 DIAGNOSIS — I10 BENIGN ESSENTIAL HYPERTENSION: ICD-10-CM

## 2025-07-01 DIAGNOSIS — E28.2 PCOS (POLYCYSTIC OVARIAN SYNDROME): ICD-10-CM

## 2025-07-01 PROCEDURE — 99207 PR NON-BILLABLE SERV PER CHARTING: CPT | Performed by: FAMILY MEDICINE

## 2025-07-02 ENCOUNTER — PATIENT OUTREACH (OUTPATIENT)
Dept: CARE COORDINATION | Facility: CLINIC | Age: 35
End: 2025-07-02
Payer: COMMERCIAL

## 2025-07-05 ENCOUNTER — PATIENT OUTREACH (OUTPATIENT)
Dept: CARE COORDINATION | Facility: CLINIC | Age: 35
End: 2025-07-05
Payer: COMMERCIAL

## 2025-08-07 ENCOUNTER — OFFICE VISIT (OUTPATIENT)
Dept: ENDOCRINOLOGY | Facility: CLINIC | Age: 35
End: 2025-08-07
Payer: COMMERCIAL

## 2025-08-07 VITALS
DIASTOLIC BLOOD PRESSURE: 90 MMHG | SYSTOLIC BLOOD PRESSURE: 129 MMHG | HEART RATE: 80 BPM | OXYGEN SATURATION: 98 % | WEIGHT: 234.2 LBS | BODY MASS INDEX: 39.98 KG/M2 | HEIGHT: 64 IN

## 2025-08-07 DIAGNOSIS — K21.9 GASTROESOPHAGEAL REFLUX DISEASE, UNSPECIFIED WHETHER ESOPHAGITIS PRESENT: ICD-10-CM

## 2025-08-07 DIAGNOSIS — Z72.0 NICOTINE USE: ICD-10-CM

## 2025-08-07 DIAGNOSIS — E28.2 PCOS (POLYCYSTIC OVARIAN SYNDROME): ICD-10-CM

## 2025-08-07 DIAGNOSIS — I10 BENIGN ESSENTIAL HYPERTENSION: ICD-10-CM

## 2025-08-07 DIAGNOSIS — E66.813 CLASS 3 SEVERE OBESITY WITH BODY MASS INDEX (BMI) OF 40.0 TO 44.9 IN ADULT (H): Primary | ICD-10-CM

## 2025-08-07 RX ORDER — OMEPRAZOLE 20 MG/1
20 CAPSULE, DELAYED RELEASE ORAL DAILY
Qty: 90 CAPSULE | Refills: 3 | Status: SHIPPED | OUTPATIENT
Start: 2025-08-07

## 2025-08-07 ASSESSMENT — SLEEP AND FATIGUE QUESTIONNAIRES
HOW LIKELY ARE YOU TO NOD OFF OR FALL ASLEEP WHILE SITTING AND TALKING TO SOMEONE: WOULD NEVER DOZE
HOW LIKELY ARE YOU TO NOD OFF OR FALL ASLEEP IN A CAR, WHILE STOPPED FOR A FEW MINUTES IN TRAFFIC: WOULD NEVER DOZE
HOW LIKELY ARE YOU TO NOD OFF OR FALL ASLEEP WHILE LYING DOWN TO REST IN THE AFTERNOON WHEN CIRCUMSTANCES PERMIT: HIGH CHANCE OF DOZING
HOW LIKELY ARE YOU TO NOD OFF OR FALL ASLEEP WHILE SITTING INACTIVE IN A PUBLIC PLACE: WOULD NEVER DOZE
HOW LIKELY ARE YOU TO NOD OFF OR FALL ASLEEP WHILE WATCHING TV: SLIGHT CHANCE OF DOZING
HOW LIKELY ARE YOU TO NOD OFF OR FALL ASLEEP WHILE SITTING QUIETLY AFTER LUNCH WITHOUT ALCOHOL: MODERATE CHANCE OF DOZING
HOW LIKELY ARE YOU TO NOD OFF OR FALL ASLEEP WHEN YOU ARE A PASSENGER IN A CAR FOR AN HOUR WITHOUT A BREAK: MODERATE CHANCE OF DOZING
HOW LIKELY ARE YOU TO NOD OFF OR FALL ASLEEP WHILE SITTING AND READING: SLIGHT CHANCE OF DOZING

## 2025-08-07 ASSESSMENT — PAIN SCALES - GENERAL: PAINLEVEL_OUTOF10: NO PAIN (0)

## 2025-08-08 ENCOUNTER — MYC MEDICAL ADVICE (OUTPATIENT)
Dept: SLEEP MEDICINE | Facility: CLINIC | Age: 35
End: 2025-08-08

## 2025-08-11 ENCOUNTER — PATIENT OUTREACH (OUTPATIENT)
Dept: CARE COORDINATION | Facility: CLINIC | Age: 35
End: 2025-08-11
Payer: COMMERCIAL

## 2025-08-12 ENCOUNTER — VIRTUAL VISIT (OUTPATIENT)
Dept: ENDOCRINOLOGY | Facility: CLINIC | Age: 35
End: 2025-08-12
Payer: COMMERCIAL

## 2025-08-12 ENCOUNTER — MYC MEDICAL ADVICE (OUTPATIENT)
Dept: FAMILY MEDICINE | Facility: CLINIC | Age: 35
End: 2025-08-12

## 2025-08-12 VITALS — WEIGHT: 234 LBS | BODY MASS INDEX: 40.17 KG/M2

## 2025-08-12 DIAGNOSIS — E66.813 CLASS 3 SEVERE OBESITY WITH BODY MASS INDEX (BMI) OF 40.0 TO 44.9 IN ADULT (H): Primary | ICD-10-CM

## 2025-08-12 PROCEDURE — 99207 PR NO CHARGE NURSE ONLY: CPT | Mod: 95

## 2025-08-13 ENCOUNTER — VIRTUAL VISIT (OUTPATIENT)
Dept: ENDOCRINOLOGY | Facility: CLINIC | Age: 35
End: 2025-08-13
Payer: COMMERCIAL

## 2025-08-13 VITALS — WEIGHT: 235 LBS | HEIGHT: 64 IN | BODY MASS INDEX: 40.12 KG/M2

## 2025-08-13 DIAGNOSIS — Z71.3 NUTRITIONAL COUNSELING: Primary | ICD-10-CM

## 2025-08-13 DIAGNOSIS — E66.813 CLASS 3 SEVERE OBESITY WITH BODY MASS INDEX (BMI) OF 40.0 TO 44.9 IN ADULT (H): ICD-10-CM

## 2025-08-13 PROCEDURE — 99207 PR NO CHARGE LOS: CPT | Mod: 95

## 2025-08-13 PROCEDURE — 97804 MEDICAL NUTRITION GROUP: CPT | Mod: 95

## 2025-08-15 ASSESSMENT — ANXIETY QUESTIONNAIRES
IF YOU CHECKED OFF ANY PROBLEMS ON THIS QUESTIONNAIRE, HOW DIFFICULT HAVE THESE PROBLEMS MADE IT FOR YOU TO DO YOUR WORK, TAKE CARE OF THINGS AT HOME, OR GET ALONG WITH OTHER PEOPLE: NOT DIFFICULT AT ALL
2. NOT BEING ABLE TO STOP OR CONTROL WORRYING: NOT AT ALL
7. FEELING AFRAID AS IF SOMETHING AWFUL MIGHT HAPPEN: NOT AT ALL
GAD7 TOTAL SCORE: 3
8. IF YOU CHECKED OFF ANY PROBLEMS, HOW DIFFICULT HAVE THESE MADE IT FOR YOU TO DO YOUR WORK, TAKE CARE OF THINGS AT HOME, OR GET ALONG WITH OTHER PEOPLE?: NOT DIFFICULT AT ALL
1. FEELING NERVOUS, ANXIOUS, OR ON EDGE: NOT AT ALL
3. WORRYING TOO MUCH ABOUT DIFFERENT THINGS: NOT AT ALL
6. BECOMING EASILY ANNOYED OR IRRITABLE: SEVERAL DAYS
GAD7 TOTAL SCORE: 3
4. TROUBLE RELAXING: SEVERAL DAYS
7. FEELING AFRAID AS IF SOMETHING AWFUL MIGHT HAPPEN: NOT AT ALL
5. BEING SO RESTLESS THAT IT IS HARD TO SIT STILL: SEVERAL DAYS
GAD7 TOTAL SCORE: 3

## 2025-08-16 ENCOUNTER — LAB (OUTPATIENT)
Dept: LAB | Facility: CLINIC | Age: 35
End: 2025-08-16
Payer: COMMERCIAL

## 2025-08-16 DIAGNOSIS — Z72.0 NICOTINE USE: ICD-10-CM

## 2025-08-16 DIAGNOSIS — E66.813 CLASS 3 SEVERE OBESITY WITH BODY MASS INDEX (BMI) OF 40.0 TO 44.9 IN ADULT (H): ICD-10-CM

## 2025-08-16 LAB
ALBUMIN SERPL BCG-MCNC: 4.2 G/DL (ref 3.5–5.2)
ALP SERPL-CCNC: 99 U/L (ref 40–150)
ALT SERPL W P-5'-P-CCNC: 34 U/L (ref 0–50)
ANION GAP SERPL CALCULATED.3IONS-SCNC: 14 MMOL/L (ref 7–15)
AST SERPL W P-5'-P-CCNC: 32 U/L (ref 0–45)
BILIRUB SERPL-MCNC: 0.3 MG/DL
BUN SERPL-MCNC: 8.6 MG/DL (ref 6–20)
CALCIUM SERPL-MCNC: 9.1 MG/DL (ref 8.8–10.4)
CHLORIDE SERPL-SCNC: 103 MMOL/L (ref 98–107)
CHOLEST SERPL-MCNC: 232 MG/DL
CREAT SERPL-MCNC: 0.83 MG/DL (ref 0.51–0.95)
EGFRCR SERPLBLD CKD-EPI 2021: >90 ML/MIN/1.73M2
ERYTHROCYTE [DISTWIDTH] IN BLOOD BY AUTOMATED COUNT: 13.3 % (ref 10–15)
EST. AVERAGE GLUCOSE BLD GHB EST-MCNC: 103 MG/DL
FASTING STATUS PATIENT QL REPORTED: ABNORMAL
FASTING STATUS PATIENT QL REPORTED: NORMAL
GLUCOSE SERPL-MCNC: 98 MG/DL (ref 70–99)
HBA1C MFR BLD: 5.2 %
HCO3 SERPL-SCNC: 25 MMOL/L (ref 22–29)
HCT VFR BLD AUTO: 42 % (ref 35–47)
HDLC SERPL-MCNC: 36 MG/DL
HGB BLD-MCNC: 14.8 G/DL (ref 11.7–15.7)
LDLC SERPL CALC-MCNC: 153 MG/DL
MCH RBC QN AUTO: 31.8 PG (ref 26.5–33)
MCHC RBC AUTO-ENTMCNC: 35.2 G/DL (ref 31.5–36.5)
MCV RBC AUTO: 90.3 FL (ref 78–100)
NONHDLC SERPL-MCNC: 196 MG/DL
PLATELET # BLD AUTO: 406 10E3/UL (ref 150–450)
POTASSIUM SERPL-SCNC: 3.4 MMOL/L (ref 3.4–5.3)
PROT SERPL-MCNC: 7.1 G/DL (ref 6.4–8.3)
PTH-INTACT SERPL-MCNC: 48 PG/ML (ref 15–65)
RBC # BLD AUTO: 4.65 10E6/UL (ref 3.8–5.2)
SODIUM SERPL-SCNC: 142 MMOL/L (ref 135–145)
TRIGL SERPL-MCNC: 214 MG/DL
TSH SERPL DL<=0.005 MIU/L-ACNC: 1.83 UIU/ML (ref 0.3–4.2)
VIT B12 SERPL-MCNC: 322 PG/ML (ref 232–1245)
VIT D+METAB SERPL-MCNC: 16 NG/ML (ref 20–50)
WBC # BLD AUTO: 8.79 10E3/UL (ref 4–11)

## 2025-08-16 PROCEDURE — 82306 VITAMIN D 25 HYDROXY: CPT

## 2025-08-16 PROCEDURE — 82607 VITAMIN B-12: CPT

## 2025-08-16 PROCEDURE — 99000 SPECIMEN HANDLING OFFICE-LAB: CPT | Performed by: PATHOLOGY

## 2025-08-16 PROCEDURE — 80061 LIPID PANEL: CPT | Performed by: PATHOLOGY

## 2025-08-16 PROCEDURE — 83970 ASSAY OF PARATHORMONE: CPT | Performed by: PATHOLOGY

## 2025-08-16 PROCEDURE — 36415 COLL VENOUS BLD VENIPUNCTURE: CPT | Performed by: PATHOLOGY

## 2025-08-16 PROCEDURE — 80053 COMPREHEN METABOLIC PANEL: CPT | Performed by: PATHOLOGY

## 2025-08-16 PROCEDURE — 83036 HEMOGLOBIN GLYCOSYLATED A1C: CPT

## 2025-08-16 PROCEDURE — 84590 ASSAY OF VITAMIN A: CPT | Mod: 90 | Performed by: PATHOLOGY

## 2025-08-16 PROCEDURE — 84443 ASSAY THYROID STIM HORMONE: CPT | Performed by: PATHOLOGY

## 2025-08-16 PROCEDURE — 85027 COMPLETE CBC AUTOMATED: CPT | Performed by: PATHOLOGY

## 2025-08-16 PROCEDURE — G0480 DRUG TEST DEF 1-7 CLASSES: HCPCS | Mod: 90 | Performed by: PATHOLOGY

## 2025-08-16 PROCEDURE — 3050F LDL-C >= 130 MG/DL: CPT | Performed by: PATHOLOGY

## 2025-08-16 PROCEDURE — 3044F HG A1C LEVEL LT 7.0%: CPT | Performed by: PATHOLOGY

## 2025-08-18 ENCOUNTER — VIRTUAL VISIT (OUTPATIENT)
Facility: CLINIC | Age: 35
End: 2025-08-18
Payer: COMMERCIAL

## 2025-08-18 DIAGNOSIS — E87.6 HYPOKALEMIA: Primary | ICD-10-CM

## 2025-08-18 DIAGNOSIS — F33.0 MAJOR DEPRESSIVE DISORDER, RECURRENT EPISODE, MILD: Primary | ICD-10-CM

## 2025-08-18 DIAGNOSIS — F41.1 GENERALIZED ANXIETY DISORDER: ICD-10-CM

## 2025-08-18 DIAGNOSIS — E66.813 CLASS 3 SEVERE OBESITY WITH BODY MASS INDEX (BMI) OF 40.0 TO 44.9 IN ADULT (H): ICD-10-CM

## 2025-08-18 PROCEDURE — 90791 PSYCH DIAGNOSTIC EVALUATION: CPT | Mod: 95 | Performed by: PSYCHOLOGIST

## 2025-08-18 RX ORDER — POTASSIUM CHLORIDE 1500 MG/1
20 TABLET, EXTENDED RELEASE ORAL 2 TIMES DAILY
Qty: 180 TABLET | Refills: 1 | Status: SHIPPED | OUTPATIENT
Start: 2025-08-18

## 2025-08-18 ASSESSMENT — PATIENT HEALTH QUESTIONNAIRE - PHQ9
SUM OF ALL RESPONSES TO PHQ QUESTIONS 1-9: 7
10. IF YOU CHECKED OFF ANY PROBLEMS, HOW DIFFICULT HAVE THESE PROBLEMS MADE IT FOR YOU TO DO YOUR WORK, TAKE CARE OF THINGS AT HOME, OR GET ALONG WITH OTHER PEOPLE: SOMEWHAT DIFFICULT
SUM OF ALL RESPONSES TO PHQ QUESTIONS 1-9: 7

## 2025-08-20 ENCOUNTER — TELEPHONE (OUTPATIENT)
Dept: GASTROENTEROLOGY | Facility: CLINIC | Age: 35
End: 2025-08-20
Payer: COMMERCIAL

## 2025-08-21 ENCOUNTER — RESULTS FOLLOW-UP (OUTPATIENT)
Dept: ENDOCRINOLOGY | Facility: CLINIC | Age: 35
End: 2025-08-21
Payer: COMMERCIAL

## 2025-08-21 DIAGNOSIS — E55.9 HYPOVITAMINOSIS D: Primary | ICD-10-CM

## 2025-08-21 DIAGNOSIS — E53.8 VITAMIN B12 DEFICIENCY (NON ANEMIC): ICD-10-CM

## 2025-08-21 LAB
ANNOTATION COMMENT IMP: NORMAL
RETINYL PALMITATE SERPL-MCNC: 0.03 MG/L
VIT A SERPL-MCNC: 0.46 MG/L

## 2025-08-21 RX ORDER — CALCIUM CARB/VIT D3/MINERALS 600 MG-200
1 TABLET,CHEWABLE ORAL DAILY
Qty: 30 TABLET | Refills: 2 | Status: SHIPPED | OUTPATIENT
Start: 2025-08-21

## 2025-08-27 ENCOUNTER — VIRTUAL VISIT (OUTPATIENT)
Facility: CLINIC | Age: 35
End: 2025-08-27
Payer: COMMERCIAL

## 2025-08-27 ENCOUNTER — DOCUMENTATION ONLY (OUTPATIENT)
Facility: CLINIC | Age: 35
End: 2025-08-27
Payer: COMMERCIAL

## 2025-08-27 DIAGNOSIS — F41.1 GENERALIZED ANXIETY DISORDER: ICD-10-CM

## 2025-08-27 DIAGNOSIS — E66.813 CLASS 3 SEVERE OBESITY WITH BODY MASS INDEX (BMI) OF 40.0 TO 44.9 IN ADULT (H): Primary | ICD-10-CM

## 2025-08-27 DIAGNOSIS — F33.0 MAJOR DEPRESSIVE DISORDER, RECURRENT EPISODE, MILD: ICD-10-CM

## 2025-08-27 PROCEDURE — 90837 PSYTX W PT 60 MINUTES: CPT | Mod: 95 | Performed by: PSYCHOLOGIST

## 2025-08-27 ASSESSMENT — PATIENT HEALTH QUESTIONNAIRE - PHQ9
5. POOR APPETITE OR OVEREATING: SEVERAL DAYS
SUM OF ALL RESPONSES TO PHQ QUESTIONS 1-9: 4

## 2025-08-27 ASSESSMENT — ANXIETY QUESTIONNAIRES
2. NOT BEING ABLE TO STOP OR CONTROL WORRYING: NOT AT ALL
1. FEELING NERVOUS, ANXIOUS, OR ON EDGE: NOT AT ALL
6. BECOMING EASILY ANNOYED OR IRRITABLE: NOT AT ALL
7. FEELING AFRAID AS IF SOMETHING AWFUL MIGHT HAPPEN: NOT AT ALL
GAD7 TOTAL SCORE: 2
5. BEING SO RESTLESS THAT IT IS HARD TO SIT STILL: SEVERAL DAYS
GAD7 TOTAL SCORE: 2
IF YOU CHECKED OFF ANY PROBLEMS ON THIS QUESTIONNAIRE, HOW DIFFICULT HAVE THESE PROBLEMS MADE IT FOR YOU TO DO YOUR WORK, TAKE CARE OF THINGS AT HOME, OR GET ALONG WITH OTHER PEOPLE: NOT DIFFICULT AT ALL
3. WORRYING TOO MUCH ABOUT DIFFERENT THINGS: NOT AT ALL

## 2025-09-03 ENCOUNTER — OFFICE VISIT (OUTPATIENT)
Dept: OBGYN | Facility: CLINIC | Age: 35
End: 2025-09-03
Attending: OBSTETRICS & GYNECOLOGY
Payer: COMMERCIAL

## 2025-09-03 VITALS
WEIGHT: 236.8 LBS | BODY MASS INDEX: 40.43 KG/M2 | SYSTOLIC BLOOD PRESSURE: 121 MMHG | HEIGHT: 64 IN | DIASTOLIC BLOOD PRESSURE: 82 MMHG | HEART RATE: 71 BPM

## 2025-09-03 DIAGNOSIS — Z30.2 ENCOUNTER FOR STERILIZATION: ICD-10-CM

## 2025-09-03 DIAGNOSIS — Z30.014 ENCOUNTER FOR INITIAL PRESCRIPTION OF INTRAUTERINE CONTRACEPTIVE DEVICE (IUD): Primary | ICD-10-CM

## 2025-09-03 PROCEDURE — 3079F DIAST BP 80-89 MM HG: CPT | Performed by: OBSTETRICS & GYNECOLOGY

## 2025-09-03 PROCEDURE — 3074F SYST BP LT 130 MM HG: CPT | Performed by: OBSTETRICS & GYNECOLOGY

## 2025-09-03 PROCEDURE — 99203 OFFICE O/P NEW LOW 30 MIN: CPT | Performed by: OBSTETRICS & GYNECOLOGY

## 2025-09-03 PROCEDURE — G0463 HOSPITAL OUTPT CLINIC VISIT: HCPCS | Performed by: OBSTETRICS & GYNECOLOGY

## 2025-09-03 RX ORDER — ACETAMINOPHEN 325 MG/1
975 TABLET ORAL ONCE
OUTPATIENT
Start: 2025-09-03 | End: 2025-09-03

## 2025-09-03 RX ORDER — ACETAMINOPHEN AND CODEINE PHOSPHATE 120; 12 MG/5ML; MG/5ML
0.35 SOLUTION ORAL DAILY
Qty: 84 TABLET | Refills: 0 | Status: SHIPPED | OUTPATIENT
Start: 2025-09-03

## 2025-09-04 ENCOUNTER — TELEPHONE (OUTPATIENT)
Dept: OBGYN | Facility: CLINIC | Age: 35
End: 2025-09-04
Payer: COMMERCIAL